# Patient Record
Sex: MALE | Race: WHITE | Employment: FULL TIME | URBAN - METROPOLITAN AREA
[De-identification: names, ages, dates, MRNs, and addresses within clinical notes are randomized per-mention and may not be internally consistent; named-entity substitution may affect disease eponyms.]

---

## 2019-07-10 ENCOUNTER — HOSPITAL ENCOUNTER (OUTPATIENT)
Age: 59
Discharge: HOME | End: 2019-07-12
Payer: COMMERCIAL

## 2019-07-10 ENCOUNTER — OFFICE VISIT (OUTPATIENT)
Dept: FAMILY MEDICINE CLINIC | Age: 59
End: 2019-07-10
Payer: COMMERCIAL

## 2019-07-10 VITALS
SYSTOLIC BLOOD PRESSURE: 110 MMHG | BODY MASS INDEX: 21.56 KG/M2 | OXYGEN SATURATION: 97 % | TEMPERATURE: 98 F | HEIGHT: 74 IN | WEIGHT: 168 LBS | DIASTOLIC BLOOD PRESSURE: 66 MMHG | HEART RATE: 60 BPM

## 2019-07-10 DIAGNOSIS — E55.9 VITAMIN D DEFICIENCY: ICD-10-CM

## 2019-07-10 DIAGNOSIS — E78.2 MIXED HYPERLIPIDEMIA: ICD-10-CM

## 2019-07-10 DIAGNOSIS — Z00.00 ROUTINE GENERAL MEDICAL EXAMINATION AT A HEALTH CARE FACILITY: ICD-10-CM

## 2019-07-10 DIAGNOSIS — R53.83 OTHER FATIGUE: ICD-10-CM

## 2019-07-10 DIAGNOSIS — Z12.5 SCREENING FOR MALIGNANT NEOPLASM OF PROSTATE: ICD-10-CM

## 2019-07-10 DIAGNOSIS — R55 NEUROCARDIOGENIC SYNCOPE: Primary | ICD-10-CM

## 2019-07-10 DIAGNOSIS — I34.1 MITRAL VALVE PROLAPSE: ICD-10-CM

## 2019-07-10 LAB
ALBUMIN SERPL-MCNC: 4.4 G/DL (ref 3.5–5.2)
ALP BLD-CCNC: 65 U/L (ref 40–129)
ALT SERPL-CCNC: 18 U/L (ref 0–40)
ANION GAP SERPL CALCULATED.3IONS-SCNC: 12 MMOL/L (ref 7–16)
AST SERPL-CCNC: 19 U/L (ref 0–39)
BASOPHILS ABSOLUTE: 0.03 E9/L (ref 0–0.2)
BASOPHILS RELATIVE PERCENT: 0.5 % (ref 0–2)
BILIRUB SERPL-MCNC: 0.6 MG/DL (ref 0–1.2)
BILIRUBIN URINE: NEGATIVE
BLOOD, URINE: NEGATIVE
BUN BLDV-MCNC: 14 MG/DL (ref 8–23)
CALCIUM SERPL-MCNC: 9.9 MG/DL (ref 8.6–10.2)
CHLORIDE BLD-SCNC: 103 MMOL/L (ref 98–107)
CHOLESTEROL, FASTING: 208 MG/DL (ref 0–199)
CLARITY: CLEAR
CO2: 26 MMOL/L (ref 22–29)
COLOR: YELLOW
CREAT SERPL-MCNC: 0.9 MG/DL (ref 0.7–1.2)
EOSINOPHILS ABSOLUTE: 0.09 E9/L (ref 0.05–0.5)
EOSINOPHILS RELATIVE PERCENT: 1.6 % (ref 0–6)
GFR AFRICAN AMERICAN: >60
GFR NON-AFRICAN AMERICAN: >60 ML/MIN/1.73
GLUCOSE BLD-MCNC: 90 MG/DL (ref 74–99)
GLUCOSE URINE: NEGATIVE MG/DL
HCT VFR BLD CALC: 45 % (ref 37–54)
HDLC SERPL-MCNC: 82 MG/DL
HEMOGLOBIN: 14.5 G/DL (ref 12.5–16.5)
IMMATURE GRANULOCYTES #: 0.01 E9/L
IMMATURE GRANULOCYTES %: 0.2 % (ref 0–5)
KETONES, URINE: NEGATIVE MG/DL
LDL CHOLESTEROL CALCULATED: 110 MG/DL (ref 0–99)
LEUKOCYTE ESTERASE, URINE: NEGATIVE
LYMPHOCYTES ABSOLUTE: 1.34 E9/L (ref 1.5–4)
LYMPHOCYTES RELATIVE PERCENT: 23.4 % (ref 20–42)
MAGNESIUM: 2.2 MG/DL (ref 1.6–2.6)
MCH RBC QN AUTO: 28.8 PG (ref 26–35)
MCHC RBC AUTO-ENTMCNC: 32.2 % (ref 32–34.5)
MCV RBC AUTO: 89.3 FL (ref 80–99.9)
MONOCYTES ABSOLUTE: 0.48 E9/L (ref 0.1–0.95)
MONOCYTES RELATIVE PERCENT: 8.4 % (ref 2–12)
NEUTROPHILS ABSOLUTE: 3.78 E9/L (ref 1.8–7.3)
NEUTROPHILS RELATIVE PERCENT: 65.9 % (ref 43–80)
NITRITE, URINE: NEGATIVE
PDW BLD-RTO: 13.9 FL (ref 11.5–15)
PH UA: 5 (ref 5–9)
PLATELET # BLD: 191 E9/L (ref 130–450)
PMV BLD AUTO: 11.2 FL (ref 7–12)
POTASSIUM SERPL-SCNC: 4.4 MMOL/L (ref 3.5–5)
PROSTATE SPECIFIC ANTIGEN: 1.05 NG/ML (ref 0–4)
PROTEIN UA: NEGATIVE MG/DL
RBC # BLD: 5.04 E12/L (ref 3.8–5.8)
SODIUM BLD-SCNC: 141 MMOL/L (ref 132–146)
SPECIFIC GRAVITY UA: >=1.03 (ref 1–1.03)
TOTAL PROTEIN: 6.9 G/DL (ref 6.4–8.3)
TRIGLYCERIDE, FASTING: 82 MG/DL (ref 0–149)
TSH SERPL DL<=0.05 MIU/L-ACNC: 2.13 UIU/ML (ref 0.27–4.2)
UROBILINOGEN, URINE: 0.2 E.U./DL
VITAMIN D 25-HYDROXY: 47 NG/ML (ref 30–100)
VLDLC SERPL CALC-MCNC: 16 MG/DL
WBC # BLD: 5.7 E9/L (ref 4.5–11.5)

## 2019-07-10 PROCEDURE — 85025 COMPLETE CBC W/AUTO DIFF WBC: CPT

## 2019-07-10 PROCEDURE — 80061 LIPID PANEL: CPT

## 2019-07-10 PROCEDURE — 81003 URINALYSIS AUTO W/O SCOPE: CPT

## 2019-07-10 PROCEDURE — 99396 PREV VISIT EST AGE 40-64: CPT | Performed by: INTERNAL MEDICINE

## 2019-07-10 PROCEDURE — 82306 VITAMIN D 25 HYDROXY: CPT

## 2019-07-10 PROCEDURE — 80053 COMPREHEN METABOLIC PANEL: CPT

## 2019-07-10 PROCEDURE — G0103 PSA SCREENING: HCPCS

## 2019-07-10 PROCEDURE — 84443 ASSAY THYROID STIM HORMONE: CPT

## 2019-07-10 PROCEDURE — 83735 ASSAY OF MAGNESIUM: CPT

## 2019-07-10 PROCEDURE — 36415 COLL VENOUS BLD VENIPUNCTURE: CPT

## 2019-07-10 RX ORDER — OMEGA-3 FATTY ACIDS CAP DELAYED RELEASE 1000 MG 1000 MG
CAPSULE DELAYED RELEASE ORAL
COMMUNITY
Start: 2013-06-19 | End: 2022-07-21 | Stop reason: ALTCHOICE

## 2019-07-10 RX ORDER — CHOLECALCIFEROL (VITAMIN D3) 125 MCG
CAPSULE ORAL
COMMUNITY
Start: 2013-06-23

## 2019-07-10 ASSESSMENT — ENCOUNTER SYMPTOMS
RHINORRHEA: 0
EYE PAIN: 0
BLOOD IN STOOL: 0
NAUSEA: 0
DIARRHEA: 0
ABDOMINAL PAIN: 0
SHORTNESS OF BREATH: 0
CONSTIPATION: 0
VOMITING: 0
COUGH: 0
SORE THROAT: 0
CHEST TIGHTNESS: 0

## 2019-07-10 ASSESSMENT — PATIENT HEALTH QUESTIONNAIRE - PHQ9
SUM OF ALL RESPONSES TO PHQ QUESTIONS 1-9: 0
2. FEELING DOWN, DEPRESSED OR HOPELESS: 0
SUM OF ALL RESPONSES TO PHQ QUESTIONS 1-9: 0
SUM OF ALL RESPONSES TO PHQ9 QUESTIONS 1 & 2: 0
1. LITTLE INTEREST OR PLEASURE IN DOING THINGS: 0

## 2019-07-10 NOTE — PROGRESS NOTES
Negative for abdominal pain, blood in stool, constipation, diarrhea, nausea and vomiting. Genitourinary: Negative for difficulty urinating, dysuria, hematuria, scrotal swelling, testicular pain and urgency. Musculoskeletal: Negative for arthralgias and gait problem. Skin: Negative for rash. Neurological: Negative for dizziness, syncope, weakness, light-headedness and headaches. Hematological: Negative for adenopathy. Does not bruise/bleed easily. Psychiatric/Behavioral: Negative for suicidal ideas. The patient is not nervous/anxious.           Current Outpatient Medications:     Omega-3 Fatty Acids (FISH OIL) 1000 MG CPDR, Take by mouth, Disp: , Rfl:     Multiple Vitamin (MULTIVITAMINS PO), Take by mouth, Disp: , Rfl:     Cholecalciferol (VITAMIN D3) 2000 units TABS, Take by mouth, Disp: , Rfl:     aspirin 81 MG tablet, Take 81 mg by mouth daily, Disp: , Rfl:     Allergies   Allergen Reactions    Penicillins Rash       Past Medical History:   Diagnosis Date    Mitral valve prolapse     Syncope June 2015       Past Surgical History:   Procedure Laterality Date    CYST REMOVAL Right     right leg       Family History   Problem Relation Age of Onset    Hyperthyroidism Mother     Atrial Fibrillation Father        Social History     Socioeconomic History    Marital status:      Spouse name: Not on file    Number of children: Not on file    Years of education: Not on file    Highest education level: Not on file   Occupational History    Not on file   Social Needs    Financial resource strain: Not on file    Food insecurity:     Worry: Not on file     Inability: Not on file    Transportation needs:     Medical: Not on file     Non-medical: Not on file   Tobacco Use    Smoking status: Never Smoker    Smokeless tobacco: Current User     Types: Chew   Substance and Sexual Activity    Alcohol use: Not on file     Comment: Occassionally on weekends    Drug use: Not on file    Sexual Plan:    Raz was seen today for annual exam and hyperlipidemia. Diagnoses and all orders for this visit:    Routine general medical examination at a health care facility  - discussed and reviewed immunizations   - discussed and reviewed screening - up to date on colonoscopy, prostate exam normal   - Discussed and reviewed other health maintenance topics   - update labs     Neurocardiogenic syncope  - neurocardiogenic syncope per tilt (2015)  - avoid dehydration  - aspirin    Mixed hyperlipidemia  - continue present treatment  - watch diet  - calculate 10 year risk   - not currently on medications     Mitral valve prolapse  - mitral valve prolapse  - follows with cardiology yearly (7/2019)     Vitamin D deficiency  - continue present treatment  - on otc vitamin D      Return in about 1 year (around 7/10/2020) for check up and review.       Orders Placed This Encounter   Procedures    Comprehensive Metabolic Panel     Standing Status:   Future     Standing Expiration Date:   7/10/2020    CBC with Differential     Standing Status:   Future     Standing Expiration Date:   7/10/2020    Magnesium     Standing Status:   Future     Standing Expiration Date:   7/10/2020    Lipid, Fasting     Standing Status:   Future     Standing Expiration Date:   7/10/2020    Vitamin D 25 Hydroxy     Standing Status:   Future     Standing Expiration Date:   7/10/2020    TSH without Reflex     Standing Status:   Future     Standing Expiration Date:   7/10/2020    Urinalysis     Standing Status:   Future     Standing Expiration Date:   7/10/2020    Psa screening     Standing Status:   Future     Standing Expiration Date:   7/10/2020       Maxwell Valentine MD  7/10/2019  10:32 AM

## 2019-08-09 PROBLEM — Z00.00 ROUTINE GENERAL MEDICAL EXAMINATION AT A HEALTH CARE FACILITY: Status: RESOLVED | Noted: 2019-07-10 | Resolved: 2019-08-09

## 2020-07-08 ENCOUNTER — TELEPHONE (OUTPATIENT)
Dept: FAMILY MEDICINE CLINIC | Age: 60
End: 2020-07-08

## 2020-07-08 ENCOUNTER — HOSPITAL ENCOUNTER (OUTPATIENT)
Age: 60
Discharge: HOME | End: 2020-07-10
Payer: COMMERCIAL

## 2020-07-08 ENCOUNTER — OFFICE VISIT (OUTPATIENT)
Dept: FAMILY MEDICINE CLINIC | Age: 60
End: 2020-07-08
Payer: COMMERCIAL

## 2020-07-08 VITALS
WEIGHT: 169 LBS | TEMPERATURE: 97 F | OXYGEN SATURATION: 98 % | HEART RATE: 77 BPM | HEIGHT: 75 IN | BODY MASS INDEX: 21.01 KG/M2 | DIASTOLIC BLOOD PRESSURE: 80 MMHG | SYSTOLIC BLOOD PRESSURE: 138 MMHG

## 2020-07-08 LAB
ALBUMIN SERPL-MCNC: 4.4 G/DL (ref 3.5–5.2)
ALP BLD-CCNC: 60 U/L (ref 40–129)
ALT SERPL-CCNC: 20 U/L (ref 0–40)
ANION GAP SERPL CALCULATED.3IONS-SCNC: 13 MMOL/L (ref 7–16)
AST SERPL-CCNC: 20 U/L (ref 0–39)
BASOPHILS ABSOLUTE: 0.03 E9/L (ref 0–0.2)
BASOPHILS RELATIVE PERCENT: 0.6 % (ref 0–2)
BILIRUB SERPL-MCNC: 0.7 MG/DL (ref 0–1.2)
BILIRUBIN URINE: NEGATIVE
BLOOD, URINE: NEGATIVE
BUN BLDV-MCNC: 12 MG/DL (ref 8–23)
CALCIUM SERPL-MCNC: 9.5 MG/DL (ref 8.6–10.2)
CHLORIDE BLD-SCNC: 105 MMOL/L (ref 98–107)
CHOLESTEROL, FASTING: 211 MG/DL (ref 0–199)
CLARITY: CLEAR
CO2: 22 MMOL/L (ref 22–29)
COLOR: YELLOW
CREAT SERPL-MCNC: 0.9 MG/DL (ref 0.7–1.2)
EOSINOPHILS ABSOLUTE: 0.09 E9/L (ref 0.05–0.5)
EOSINOPHILS RELATIVE PERCENT: 1.9 % (ref 0–6)
GFR AFRICAN AMERICAN: >60
GFR NON-AFRICAN AMERICAN: >60 ML/MIN/1.73
GLUCOSE BLD-MCNC: 102 MG/DL (ref 74–99)
GLUCOSE URINE: NEGATIVE MG/DL
HCT VFR BLD CALC: 42.6 % (ref 37–54)
HDLC SERPL-MCNC: 81 MG/DL
HEMOGLOBIN: 14 G/DL (ref 12.5–16.5)
IMMATURE GRANULOCYTES #: 0.01 E9/L
IMMATURE GRANULOCYTES %: 0.2 % (ref 0–5)
KETONES, URINE: NEGATIVE MG/DL
LDL CHOLESTEROL CALCULATED: 113 MG/DL (ref 0–99)
LEUKOCYTE ESTERASE, URINE: NEGATIVE
LYMPHOCYTES ABSOLUTE: 1.1 E9/L (ref 1.5–4)
LYMPHOCYTES RELATIVE PERCENT: 23.3 % (ref 20–42)
MAGNESIUM: 2.2 MG/DL (ref 1.6–2.6)
MCH RBC QN AUTO: 28.7 PG (ref 26–35)
MCHC RBC AUTO-ENTMCNC: 32.9 % (ref 32–34.5)
MCV RBC AUTO: 87.5 FL (ref 80–99.9)
MONOCYTES ABSOLUTE: 0.42 E9/L (ref 0.1–0.95)
MONOCYTES RELATIVE PERCENT: 8.9 % (ref 2–12)
NEUTROPHILS ABSOLUTE: 3.08 E9/L (ref 1.8–7.3)
NEUTROPHILS RELATIVE PERCENT: 65.1 % (ref 43–80)
NITRITE, URINE: NEGATIVE
PDW BLD-RTO: 13.6 FL (ref 11.5–15)
PH UA: 6 (ref 5–9)
PLATELET # BLD: 186 E9/L (ref 130–450)
PMV BLD AUTO: 11.2 FL (ref 7–12)
POTASSIUM SERPL-SCNC: 4.2 MMOL/L (ref 3.5–5)
PROSTATE SPECIFIC ANTIGEN: 1.08 NG/ML (ref 0–4)
PROTEIN UA: NEGATIVE MG/DL
RBC # BLD: 4.87 E12/L (ref 3.8–5.8)
SODIUM BLD-SCNC: 140 MMOL/L (ref 132–146)
SPECIFIC GRAVITY UA: 1.01 (ref 1–1.03)
TOTAL PROTEIN: 6.3 G/DL (ref 6.4–8.3)
TRIGLYCERIDE, FASTING: 83 MG/DL (ref 0–149)
TSH SERPL DL<=0.05 MIU/L-ACNC: 2.46 UIU/ML (ref 0.27–4.2)
UROBILINOGEN, URINE: 0.2 E.U./DL
VLDLC SERPL CALC-MCNC: 17 MG/DL
WBC # BLD: 4.7 E9/L (ref 4.5–11.5)

## 2020-07-08 PROCEDURE — 69210 REMOVE IMPACTED EAR WAX UNI: CPT | Performed by: INTERNAL MEDICINE

## 2020-07-08 PROCEDURE — 80061 LIPID PANEL: CPT

## 2020-07-08 PROCEDURE — 84443 ASSAY THYROID STIM HORMONE: CPT

## 2020-07-08 PROCEDURE — 85025 COMPLETE CBC W/AUTO DIFF WBC: CPT

## 2020-07-08 PROCEDURE — 80053 COMPREHEN METABOLIC PANEL: CPT

## 2020-07-08 PROCEDURE — 36415 COLL VENOUS BLD VENIPUNCTURE: CPT

## 2020-07-08 PROCEDURE — 83735 ASSAY OF MAGNESIUM: CPT

## 2020-07-08 PROCEDURE — 81003 URINALYSIS AUTO W/O SCOPE: CPT

## 2020-07-08 PROCEDURE — G0103 PSA SCREENING: HCPCS

## 2020-07-08 PROCEDURE — 99396 PREV VISIT EST AGE 40-64: CPT | Performed by: INTERNAL MEDICINE

## 2020-07-08 ASSESSMENT — ENCOUNTER SYMPTOMS
NAUSEA: 0
DIARRHEA: 0
COUGH: 0
SHORTNESS OF BREATH: 0
CHEST TIGHTNESS: 0
EYE PAIN: 0
BLOOD IN STOOL: 0
SORE THROAT: 0
ABDOMINAL PAIN: 0
VOMITING: 0
CONSTIPATION: 0
RHINORRHEA: 0

## 2020-07-08 NOTE — TELEPHONE ENCOUNTER
Please let the patient know that blood work results showed    Cholesterol levels were elevated. HDL or good cholesterol was also elevated in the beneficial range. The 10-year ASCVD risk score (Pablo Oh., et al., 2013) is: 12.3%, which is borderline higher risk for heart vascular complications and would consider statin medications    Sugar was elevated. Would recommend A1c for 3-month sugar control assessment. Recommend to watch diet from carbohydrates and sugars.      Thyroid labs were normal    PSA for prostate was normal    Urine analysis was normal     Electrolytes, liver, and kidney functions were normal    Blood counts were normal    Please send a copy of the blood work results to the patient    Thanks

## 2020-07-08 NOTE — PROGRESS NOTES
[facility]) Physicians   Internal Medicine     2020  Raz Molina. : 1960 Sex: male  Age:59 y.o. Chief Complaint   Patient presents with    Annual Exam        HPI:   Patient presents to office for review and management of chronic medical conditions.     - No previous office visit, urgent care, ER or hospital visits since last visit. No surgeries or other procedures. - States cerumen impaction. States had ENT in past.  States ear discomfort and hearing changes improved. - States no further passing out since last visit. has seen cardiology and EP. States had tilt table (). States was positive for neurocardiogenic syncope. No further passing out or blacking out. States no changes. States trying to stay hydrated. - Has mitral valve prolapse - follows with cardiology yearly - last visit 3/2018. Had echo 3544 - Showed diastolic dysfunction and left vent hypertrophy - no valve disease. Follow up yearly. - States has some chronic aches to joints. States seem to move and hurts somewhere different locations on different days. Not taking anything for pain. - States has high cholesterol. Trying to watch diet. Not currently on medications.     - States having decreased urine flow, sates increased frequency. No nocturia. Health Maintenance   - immunizations:   Influenza Vaccination  - (2013), (2015), (2016), (2017), (2018), (2019)   Pneumonia Vaccination  Zoster/Shingles Vaccine  Tetanus Vaccination (2018) - [facility]     - Screenings:   Colonoscopy - (2016) - tortuous - repeat 8-10yrs  Prostate     tilt table - () - positive for neurocardiogenic syncope    2D ECHO - (3/2017) - stage II diastolic dysfunction, mild LVH    ROS:  Review of Systems   Constitutional: Negative for appetite change, chills, fever and unexpected weight change. HENT: Negative for congestion, rhinorrhea and sore throat. Eyes: Negative for pain and visual disturbance.    Respiratory: Negative for cough, chest tightness and shortness of breath. Cardiovascular: Negative for chest pain and palpitations. Gastrointestinal: Negative for abdominal pain, blood in stool, constipation, diarrhea, nausea and vomiting. Genitourinary: Positive for frequency. Negative for difficulty urinating, dysuria, hematuria, scrotal swelling, testicular pain and urgency. Musculoskeletal: Negative for arthralgias and gait problem. Skin: Negative for rash. Neurological: Negative for dizziness, syncope, weakness, light-headedness and headaches. Hematological: Negative for adenopathy. Does not bruise/bleed easily. Psychiatric/Behavioral: Negative for suicidal ideas. The patient is not nervous/anxious.           Current Outpatient Medications:     Omega-3 Fatty Acids (FISH OIL) 1000 MG CPDR, Take by mouth, Disp: , Rfl:     Multiple Vitamin (MULTIVITAMINS PO), Take by mouth, Disp: , Rfl:     Cholecalciferol (VITAMIN D3) 2000 units TABS, Take by mouth, Disp: , Rfl:     aspirin 81 MG tablet, Take 81 mg by mouth daily, Disp: , Rfl:     Allergies   Allergen Reactions    Penicillins Rash       Past Medical History:   Diagnosis Date    Hyperlipidemia     Mitral valve prolapse     Mononucleosis     Syncope June 2015       Past Surgical History:   Procedure Laterality Date    CYST REMOVAL Right     right leg       Family History   Problem Relation Age of Onset    Hyperthyroidism Mother     High Blood Pressure Mother     Thyroid Disease Mother     Atrial Fibrillation Father     High Cholesterol Father     Heart Disease Father     Diabetes Father         Non Insulin Dependent        Social History     Socioeconomic History    Marital status:      Spouse name: Not on file    Number of children: Not on file    Years of education: Not on file    Highest education level: Not on file   Occupational History    Not on file   Social Needs    Financial resource strain: Not on file    Food insecurity     Worry: Not on file     Inability: Not on file    Transportation needs     Medical: Not on file     Non-medical: Not on file   Tobacco Use    Smoking status: Light Tobacco Smoker     Types: Cigars    Smokeless tobacco: Current User     Types: Chew   Substance and Sexual Activity    Alcohol use: Yes     Comment: Occassionally on weekends    Drug use: Not on file    Sexual activity: Not on file   Lifestyle    Physical activity     Days per week: Not on file     Minutes per session: Not on file    Stress: Not on file   Relationships    Social connections     Talks on phone: Not on file     Gets together: Not on file     Attends Judaism service: Not on file     Active member of club or organization: Not on file     Attends meetings of clubs or organizations: Not on file     Relationship status: Not on file    Intimate partner violence     Fear of current or ex partner: Not on file     Emotionally abused: Not on file     Physically abused: Not on file     Forced sexual activity: Not on file   Other Topics Concern    Not on file   Social History Narrative    Not on file       Vitals:    07/08/20 1001   BP: 138/80  Comment: 138 80   Site: Left Upper Arm   Position: Sitting   Cuff Size: Large Adult   Pulse: 77   Temp: 97 °F (36.1 °C)   SpO2: 98%   Weight: 169 lb (76.7 kg)   Height: 6' 3\" (1.905 m)       Exam:  Physical Exam  Constitutional:       Appearance: He is well-developed. HENT:      Head: Normocephalic and atraumatic. Right Ear: External ear normal. There is impacted cerumen. Left Ear: External ear normal.   Eyes:      Pupils: Pupils are equal, round, and reactive to light. Neck:      Musculoskeletal: Normal range of motion and neck supple. Thyroid: No thyromegaly. Cardiovascular:      Rate and Rhythm: Normal rate and regular rhythm. Heart sounds: Normal heart sounds. No murmur. Pulmonary:      Effort: Pulmonary effort is normal.      Breath sounds: Normal breath sounds.  No wheezing or Standing Status:   Future     Number of Occurrences:   1     Standing Expiration Date:   7/8/2021    Urinalysis     Standing Status:   Future     Number of Occurrences:   1     Standing Expiration Date:   7/8/2021    Psa screening     Standing Status:   Future     Number of Occurrences:   1     Standing Expiration Date:   7/8/2021    CBC Auto Differential     Standing Status:   Future     Number of Occurrences:   1     Standing Expiration Date:   7/8/2021    NC REMOVAL IMPACTED CERUMEN INSTRUMENTATION UNILAT     Requested Prescriptions      No prescriptions requested or ordered in this encounter         Yuridia Dominguez MD  7/8/2020  12:05 PM

## 2020-07-08 NOTE — LETTER
[facility]  [address] [place] [address]  Phone: [phone]  Fax: [phone]    Domenic Coleman MD        July 9, 2020     [address] [facility] [address]      Dear Je Nam:    Below are the results from your recent visit:    Resulted Orders   CBC Auto Differential   Result Value Ref Range    WBC 4.7 4.5 - 11.5 E9/L    RBC 4.87 3.80 - 5.80 E12/L    Hemoglobin 14.0 12.5 - 16.5 g/dL    Hematocrit 42.6 37.0 - 54.0 %    MCV 87.5 80.0 - 99.9 fL    MCH 28.7 26.0 - 35.0 pg    MCHC 32.9 32.0 - 34.5 %    RDW 13.6 11.5 - 15.0 fL    Platelets 432 879 - 445 E9/L    MPV 11.2 7.0 - 12.0 fL    Neutrophils % 65.1 43.0 - 80.0 %    Immature Granulocytes % 0.2 0.0 - 5.0 %    Lymphocytes % 23.3 20.0 - 42.0 %    Monocytes % 8.9 2.0 - 12.0 %    Eosinophils % 1.9 0.0 - 6.0 %    Basophils % 0.6 0.0 - 2.0 %    Neutrophils Absolute 3.08 1.80 - 7.30 E9/L    Immature Granulocytes # 0.01 E9/L    Lymphocytes Absolute 1.10 (L) 1.50 - 4.00 E9/L    Monocytes Absolute 0.42 0.10 - 0.95 E9/L    Eosinophils Absolute 0.09 0.05 - 0.50 E9/L    Basophils Absolute 0.03 0.00 - 0.20 E9/L   Psa screening   Result Value Ref Range    PSA 1.08 0.00 - 4.00 ng/mL   Urinalysis   Result Value Ref Range    Color, UA Yellow Straw/Yellow    Clarity, UA Clear Clear    Glucose, Ur Negative Negative mg/dL    Bilirubin Urine Negative Negative    Ketones, Urine Negative Negative mg/dL    Specific Gravity, UA 1.015 1.005 - 1.030    Blood, Urine Negative Negative    pH, UA 6.0 5.0 - 9.0    Protein, UA Negative Negative mg/dL    Urobilinogen, Urine 0.2 <2.0 E.U./dL    Nitrite, Urine Negative Negative    Leukocyte Esterase, Urine Negative Negative   TSH without Reflex   Result Value Ref Range    TSH 2.460 0.270 - 4.200 uIU/mL   Lipid, Fasting   Result Value Ref Range    Cholesterol, Fasting 211 (H) 0 - 199 mg/dL    Triglyceride, Fasting 83 0 - 149 mg/dL    HDL 81 >40 mg/dL LDL Calculated 113 (H) 0 - 99 mg/dL    VLDL Cholesterol Calculated 17 mg/dL   Magnesium   Result Value Ref Range    Magnesium 2.2 1.6 - 2.6 mg/dL   Comprehensive Metabolic Panel   Result Value Ref Range    Sodium 140 132 - 146 mmol/L    Potassium 4.2 3.5 - 5.0 mmol/L    Chloride 105 98 - 107 mmol/L    CO2 22 22 - 29 mmol/L    Anion Gap 13 7 - 16 mmol/L    Glucose 102 (H) 74 - 99 mg/dL    BUN 12 8 - 23 mg/dL    CREATININE 0.9 0.7 - 1.2 mg/dL    GFR Non-African American >60 >=60 mL/min/1.73      Comment:      Chronic Kidney Disease: less than 60 ml/min/1.73 sq.m. Kidney Failure: less than 15 ml/min/1.73 sq.m. Results valid for patients 18 years and older. GFR  >60     Calcium 9.5 8.6 - 10.2 mg/dL    Total Protein 6.3 (L) 6.4 - 8.3 g/dL    Alb 4.4 3.5 - 5.2 g/dL    Total Bilirubin 0.7 0.0 - 1.2 mg/dL    Alkaline Phosphatase 60 40 - 129 U/L    ALT 20 0 - 40 U/L    AST 20 0 - 39 U/L   Please let the patient know that blood work results showed  Cholesterol levels were elevated. HDL or good cholesterol was also elevated in the beneficial range. The 10-year ASCVD risk score (Yasir Kowalski., et al., 2013) is: 12.3%, which is borderline higher risk for heart vascular complications and would consider statin medications  Sugar was elevated. Would recommend A1c for 3-month sugar control assessment. Recommend to watch diet from carbohydrates and sugars. Thyroid labs were normal  PSA for prostate was normal  Urine analysis was normal   Electrolytes, liver, and kidney functions were normal  Blood counts were normal  Please send a copy of the blood work results to the patient    If you have any questions or concerns, please don't hesitate to call.     Sincerely,    Sherwin Ruiz MD

## 2021-05-18 ENCOUNTER — OFFICE VISIT (OUTPATIENT)
Dept: FAMILY MEDICINE CLINIC | Age: 61
End: 2021-05-18
Payer: COMMERCIAL

## 2021-05-18 VITALS
SYSTOLIC BLOOD PRESSURE: 128 MMHG | WEIGHT: 167 LBS | HEART RATE: 75 BPM | DIASTOLIC BLOOD PRESSURE: 70 MMHG | BODY MASS INDEX: 20.76 KG/M2 | OXYGEN SATURATION: 98 % | HEIGHT: 75 IN | TEMPERATURE: 97.5 F

## 2021-05-18 DIAGNOSIS — M54.50 ACUTE BILATERAL LOW BACK PAIN WITHOUT SCIATICA: Primary | ICD-10-CM

## 2021-05-18 PROCEDURE — 99213 OFFICE O/P EST LOW 20 MIN: CPT | Performed by: INTERNAL MEDICINE

## 2021-05-18 ASSESSMENT — ENCOUNTER SYMPTOMS
COUGH: 0
CHEST TIGHTNESS: 0
DIARRHEA: 0
EYE PAIN: 0
BLOOD IN STOOL: 0
SORE THROAT: 0
CONSTIPATION: 0
RHINORRHEA: 0
ABDOMINAL PAIN: 0
NAUSEA: 0
SHORTNESS OF BREATH: 0
VOMITING: 0

## 2021-05-18 ASSESSMENT — PATIENT HEALTH QUESTIONNAIRE - PHQ9
SUM OF ALL RESPONSES TO PHQ QUESTIONS 1-9: 0
SUM OF ALL RESPONSES TO PHQ QUESTIONS 1-9: 0

## 2021-05-18 NOTE — PATIENT INSTRUCTIONS
Patient Education        Low Back Pain: Exercises  Introduction  Here are some examples of exercises for you to try. The exercises may be suggested for a condition or for rehabilitation. Start each exercise slowly. Ease off the exercises if you start to have pain. You will be told when to start these exercises and which ones will work best for you. How to do the exercises  Press-up   1. Lie on your stomach, supporting your body with your forearms. 2. Press your elbows down into the floor to raise your upper back. As you do this, relax your stomach muscles and allow your back to arch without using your back muscles. As your press up, do not let your hips or pelvis come off the floor. 3. Hold for 15 to 30 seconds, then relax. 4. Repeat 2 to 4 times. Alternate arm and leg (bird dog) exercise   Do this exercise slowly. Try to keep your body straight at all times, and do not let one hip drop lower than the other. 1. Start on the floor, on your hands and knees. 2. Tighten your belly muscles. 3. Raise one leg off the floor, and hold it straight out behind you. Be careful not to let your hip drop down, because that will twist your trunk. 4. Hold for about 6 seconds, then lower your leg and switch to the other leg. 5. Repeat 8 to 12 times on each leg. 6. Over time, work up to holding for 10 to 30 seconds each time. 7. If you feel stable and secure with your leg raised, try raising the opposite arm straight out in front of you at the same time. Knee-to-chest exercise   1. Lie on your back with your knees bent and your feet flat on the floor. 2. Bring one knee to your chest, keeping the other foot flat on the floor (or keeping the other leg straight, whichever feels better on your lower back). 3. Keep your lower back pressed to the floor. Hold for at least 15 to 30 seconds. 4. Relax, and lower the knee to the starting position. 5. Repeat with the other leg. Repeat 2 to 4 times with each leg.   6. To get more stretch, put your other leg flat on the floor while pulling your knee to your chest.    Curl-ups   1. Lie on the floor on your back with your knees bent at a 90-degree angle. Your feet should be flat on the floor, about 12 inches from your buttocks. 2. Cross your arms over your chest. If this bothers your neck, try putting your hands behind your neck (not your head), with your elbows spread apart. 3. Slowly tighten your belly muscles and raise your shoulder blades off the floor. 4. Keep your head in line with your body, and do not press your chin to your chest.  5. Hold this position for 1 or 2 seconds, then slowly lower yourself back down to the floor. 6. Repeat 8 to 12 times. Pelvic tilt exercise   1. Lie on your back with your knees bent. 2. \"Brace\" your stomach. This means to tighten your muscles by pulling in and imagining your belly button moving toward your spine. You should feel like your back is pressing to the floor and your hips and pelvis are rocking back. 3. Hold for about 6 seconds while you breathe smoothly. 4. Repeat 8 to 12 times. Heel dig bridging   1. Lie on your back with both knees bent and your ankles bent so that only your heels are digging into the floor. Your knees should be bent about 90 degrees. 2. Then push your heels into the floor, squeeze your buttocks, and lift your hips off the floor until your shoulders, hips, and knees are all in a straight line. 3. Hold for about 6 seconds as you continue to breathe normally, and then slowly lower your hips back down to the floor and rest for up to 10 seconds. 4. Do 8 to 12 repetitions. Hamstring stretch in doorway   1. Lie on your back in a doorway, with one leg through the open door. 2. Slide your leg up the wall to straighten your knee. You should feel a gentle stretch down the back of your leg. 3. Hold the stretch for at least 15 to 30 seconds. Do not arch your back, point your toes, or bend either knee.  Keep one heel touching the floor and the other heel touching the wall. 4. Repeat with your other leg. 5. Do 2 to 4 times for each leg. Hip flexor stretch   1. Kneel on the floor with one knee bent and one leg behind you. Place your forward knee over your foot. Keep your other knee touching the floor. 2. Slowly push your hips forward until you feel a stretch in the upper thigh of your rear leg. 3. Hold the stretch for at least 15 to 30 seconds. Repeat with your other leg. 4. Do 2 to 4 times on each side. Wall sit   1. Stand with your back 10 to 12 inches away from a wall. 2. Lean into the wall until your back is flat against it. 3. Slowly slide down until your knees are slightly bent, pressing your lower back into the wall. 4. Hold for about 6 seconds, then slide back up the wall. 5. Repeat 8 to 12 times. Follow-up care is a key part of your treatment and safety. Be sure to make and go to all appointments, and call your doctor if you are having problems. It's also a good idea to know your test results and keep a list of the medicines you take. Where can you learn more? Go to https://[employer].Clinical Data. org and sign in to your [employer] account. Enter N546 in the [employer] box to learn more about \"Low Back Pain: Exercises. \"     If you do not have an account, please click on the \"Sign Up Now\" link. Current as of: November 16, 2020               Content Version: 12.8  © 2006-2021 [employer]. Care instructions adapted under license by [facility] ([facility]). If you have questions about a medical condition or this instruction, always ask your healthcare professional. [place] [address] any warranty or liability for your use of this information.

## 2021-05-18 NOTE — PROGRESS NOTES
[facility]) Physicians   Internal Medicine     2021  Raz Brian. : 1960 Sex: male  Age:59 y.o. Chief Complaint   Patient presents with    Back Pain     Lower right sided back pain for the last 6 weeks. Denies injury.  Blood Pressure Check     Checking BP at home w/ a wrist cuff. BP today on home machine was 139/82         HPI:   Patient presents to office for evaluation of the following medical concerns. - States having low back pain. States started about 6 weeks ago. States worse if bending over. States right sided lateral paraspinal muscles. States will be an ache and other times is sharp. Currently pain is 0/10. States still if sitting for prolonged times and is relieved if walks around. No numbness, tingling or weakness. No trauma or injury. No fever or chills. No nausea or vomiting. No abdo pain, diarrhea, constipation, Melena or hematochezia. No dysuria, frequency, urgency, hematuria. - States cerumen impaction. States had ENT in past.  States ear discomfort and hearing changes improved. - States no further passing out since last visit. has seen cardiology and EP. States had tilt table (). States was positive for neurocardiogenic syncope. No further passing out or blacking out. States no changes. States trying to stay hydrated. - Has mitral valve prolapse - follows with cardiology yearly - last visit 3/2018. Had echo 1411 - Showed diastolic dysfunction and left vent hypertrophy - no valve disease. Follow up yearly. - States has some chronic aches to joints. States seem to move and hurts somewhere different locations on different days. Not taking anything for pain. - States has high cholesterol. Trying to watch diet. Not currently on medications.     - States having decreased urine flow, sates increased frequency. No nocturia.      Health Maintenance   - immunizations:   Influenza Vaccination  - (2013), (2015), (2016), (2017), (2018), (2019), (2020) Pneumonia Vaccination  Zoster/Shingles Vaccine - (10/2020) #1, (12/5/2020) #2 - shingrix  Tetanus Vaccination (2018) - [facility]   covid (3/12/2021) #1, (4/9/2021) #2 - moderna     - Screenings:   Colonoscopy - (4/2016) - tortuous - repeat 8-10yrs  Prostate     tilt table - (2015) - positive for neurocardiogenic syncope    2D ECHO - (3/2017) - stage II diastolic dysfunction, mild LVH    ROS:  Review of Systems   Constitutional: Negative for appetite change, chills, fever and unexpected weight change. HENT: Negative for congestion, rhinorrhea and sore throat. Eyes: Negative for pain and visual disturbance. Respiratory: Negative for cough, chest tightness and shortness of breath. Cardiovascular: Negative for chest pain and palpitations. Gastrointestinal: Negative for abdominal pain, blood in stool, constipation, diarrhea, nausea and vomiting. Genitourinary: Positive for frequency. Negative for difficulty urinating, dysuria, hematuria, scrotal swelling, testicular pain and urgency. Musculoskeletal: Negative for arthralgias and gait problem. Skin: Negative for rash. Neurological: Negative for dizziness, syncope, weakness, light-headedness and headaches. Hematological: Negative for adenopathy. Does not bruise/bleed easily. Psychiatric/Behavioral: Negative for suicidal ideas. The patient is not nervous/anxious.           Current Outpatient Medications:     Omega-3 Fatty Acids (FISH OIL) 1000 MG CPDR, Take by mouth, Disp: , Rfl:     Multiple Vitamin (MULTIVITAMINS PO), Take by mouth, Disp: , Rfl:     Cholecalciferol (VITAMIN D3) 2000 units TABS, Take by mouth, Disp: , Rfl:     aspirin 81 MG tablet, Take 81 mg by mouth daily, Disp: , Rfl:     Allergies   Allergen Reactions    Penicillins Rash       Past Medical History:   Diagnosis Date    Hyperlipidemia     Mitral valve prolapse     Mononucleosis     Syncope June 2015       Past Surgical History:   Procedure Laterality Date    CYST REMOVAL Right     right leg       Family History   Problem Relation Age of Onset    Hyperthyroidism Mother     High Blood Pressure Mother     Thyroid Disease Mother     Atrial Fibrillation Father     High Cholesterol Father     Heart Disease Father     Diabetes Father         Non Insulin Dependent        Social History     Socioeconomic History    Marital status:      Spouse name: Not on file    Number of children: Not on file    Years of education: Not on file    Highest education level: Not on file   Occupational History    Not on file   Tobacco Use    Smoking status: Former Smoker     Types: Cigars    Smokeless tobacco: Current User     Types: Chew    Tobacco comment: Former cigar smoker. Substance and Sexual Activity    Alcohol use: Yes     Comment: Occassionally on weekends    Drug use: Not on file    Sexual activity: Not on file   Other Topics Concern    Not on file   Social History Narrative    Not on file     Social Determinants of Health     Financial Resource Strain:     Difficulty of Paying Living Expenses:    Food Insecurity:     Worried About Running Out of Food in the Last Year:     [address] in the Last Year:    Transportation Needs:     Lack of Transportation (Medical):      Lack of Transportation (Non-Medical):    Physical Activity:     Days of Exercise per Week:     Minutes of Exercise per Session:    Stress:     Feeling of Stress :    Social Connections:     Frequency of Communication with Friends and Family:     Frequency of Social Gatherings with Friends and Family:     Attends [employer]ist Services:     Active Member of Clubs or Organizations:     Attends Club or Organization Meetings:     Marital Status:    Intimate Partner Violence:     Fear of Current or Ex-Partner:     Emotionally Abused:     Physically Abused:     Sexually Abused:        Vitals:    05/18/21 1043 05/18/21 1103   BP: 130/80 128/70   Site: Right Upper Arm Left Upper Arm   Position: cardiology yearly (7/2019)     Vitamin D deficiency  - continue present treatment  - on otc vitamin D    Impacted cerumen of right ear  - cerumen removed via curette - no trauma noted, canal open post removal     Return for check up and review, as scheduled.     Orders Placed This Encounter   Procedures    XR LUMBAR SPINE (2-3 VIEWS)     Standing Status:   Future     Standing Expiration Date:   5/18/2022     Order Specific Question:   Reason for exam:     Answer:   right paraspinal low back pain   [facility] [address], [address], [address], General acute hospital     Referral Priority:   Routine     Referral Type:   Eval and Treat     Referral Reason:   Specialty Services Required     Referred to Provider:   Milton Babinski, DC     Requested Specialty:   Chiropractic Medicine     Number of Visits Requested:   1     Requested Prescriptions      No prescriptions requested or ordered in this encounter         Kristy Riley MD  5/18/2021  11:29 AM

## 2021-05-19 ENCOUNTER — TELEPHONE (OUTPATIENT)
Dept: FAMILY MEDICINE CLINIC | Age: 61
End: 2021-05-19

## 2021-05-19 NOTE — TELEPHONE ENCOUNTER
Please let the patient know that x-ray of the lumbar spine per radiology report suggested    Lumbar spine showed some mild spurring type changes and some more pronounced vertebral facet arthritic type changes at L5 and S1    No fractures dislocations or other bony abnormalities    Recommend to continue current treatment recommendations and to call if worsening or not improving    Thanks

## 2021-05-26 ENCOUNTER — OFFICE VISIT (OUTPATIENT)
Dept: CHIROPRACTIC MEDICINE | Age: 61
End: 2021-05-26
Payer: COMMERCIAL

## 2021-05-26 VITALS — RESPIRATION RATE: 16 BRPM | TEMPERATURE: 97.7 F | HEART RATE: 61 BPM | OXYGEN SATURATION: 97 %

## 2021-05-26 DIAGNOSIS — G89.29 CHRONIC RIGHT-SIDED LOW BACK PAIN WITHOUT SCIATICA: Primary | ICD-10-CM

## 2021-05-26 DIAGNOSIS — M54.50 CHRONIC RIGHT-SIDED LOW BACK PAIN WITHOUT SCIATICA: Primary | ICD-10-CM

## 2021-05-26 DIAGNOSIS — M47.816 LUMBAR SPONDYLOSIS: ICD-10-CM

## 2021-05-26 PROCEDURE — 99203 OFFICE O/P NEW LOW 30 MIN: CPT | Performed by: CHIROPRACTOR

## 2021-05-26 PROCEDURE — 98940 CHIROPRACT MANJ 1-2 REGIONS: CPT | Performed by: CHIROPRACTOR

## 2021-05-26 PROCEDURE — 97014 ELECTRIC STIMULATION THERAPY: CPT | Performed by: CHIROPRACTOR

## 2021-05-26 ASSESSMENT — ENCOUNTER SYMPTOMS
COUGH: 0
BACK PAIN: 1
SHORTNESS OF BREATH: 0
BOWEL INCONTINENCE: 0

## 2021-05-26 NOTE — PROGRESS NOTES
[facility] N LIMA CHIRO    21  Max E [facility]. : 1960 Sex: male  Age: 56 y.o. Patient was referred by Cindy Melendez MD    Chief Complaint   Patient presents with    Lower Back Pain     Right sided lower back pain for 6 weeks. No known injury        Chronic LBP - worse over last 6 weeks  Used to come on after driving or riding tractor. 6weeks ago came on, hit right side, and has been off and on since  Worse with forward bending, sitting for ~15+ mins then trying to get up and go it will get him  Lifting-ok     Back Pain  This is a chronic problem. The current episode started more than 1 year ago. The problem occurs intermittently. The pain is present in the lumbar spine. Quality: poke, catch  The pain does not radiate. Pain scale: 1 now, up to 5 at worst. The symptoms are aggravated by coughing, bending and sitting. Pertinent negatives include no bladder incontinence, bowel incontinence, fever, leg pain, numbness, tingling or weakness. He has tried home exercises for the symptoms. Red Flags:  none    Review of Systems   Constitutional: Negative for chills and fever. Respiratory: Negative for cough and shortness of breath. Gastrointestinal: Negative for bowel incontinence. Genitourinary: Negative for bladder incontinence. Musculoskeletal: Positive for back pain. Neurological: Negative for tingling, weakness and numbness.          Current Outpatient Medications:     Omega-3 Fatty Acids (FISH OIL) 1000 MG CPDR, Take by mouth, Disp: , Rfl:     Multiple Vitamin (MULTIVITAMINS PO), Take by mouth, Disp: , Rfl:     Cholecalciferol (VITAMIN D3) 2000 units TABS, Take by mouth, Disp: , Rfl:     aspirin 81 MG tablet, Take 81 mg by mouth daily, Disp: , Rfl:     Allergies   Allergen Reactions    Penicillins Rash       Past Medical History:   Diagnosis Date    Hyperlipidemia     Mitral valve prolapse     Mononucleosis     Syncope 2015     Family History   Problem Relation Age of Onset    Hyperthyroidism Mother     High Blood Pressure Mother     Thyroid Disease Mother     Atrial Fibrillation Father     High Cholesterol Father     Heart Disease Father     Diabetes Father         Non Insulin Dependent      Past Surgical History:   Procedure Laterality Date    CYST REMOVAL Right     right leg     Social History     Socioeconomic History    Marital status:      Spouse name: Not on file    Number of children: Not on file    Years of education: Not on file    Highest education level: Not on file   Occupational History    Not on file   Tobacco Use    Smoking status: Former Smoker     Types: Cigars    Smokeless tobacco: Current User     Types: Chew    Tobacco comment: Former cigar smoker. Substance and Sexual Activity    Alcohol use: Yes     Comment: Occassionally on weekends    Drug use: Not on file    Sexual activity: Not on file   Other Topics Concern    Not on file   Social History Narrative    Not on file     Social Determinants of Health     Financial Resource Strain:     Difficulty of Paying Living Expenses:    Food Insecurity:     Worried About Running Out of Food in the Last Year:     [address] in the Last Year:    Transportation Needs:     Lack of Transportation (Medical):      Lack of Transportation (Non-Medical):    Physical Activity:     Days of Exercise per Week:     Minutes of Exercise per Session:    Stress:     Feeling of Stress :    Social Connections:     Frequency of Communication with Friends and Family:     Frequency of Social Gatherings with Friends and Family:     Attends Scientologist Services:     Active Member of Clubs or Organizations:     Attends Club or Organization Meetings:     Marital Status:    Intimate Partner Violence:     Fear of Current or Ex-Partner:     Emotionally Abused:     Physically Abused:     Sexually Abused:        Vitals:    05/26/21 0821   Pulse: 61   Resp: 16   Temp: 97.7 °F (36.5 °C)   TempSrc: Temporal SpO2: 97%       EXAM:  Appears well. No acute distress. Oriented x3. Ambulates without difficulty. No antalgia or list.    Lumbar flexion: 60/60  Lumbar extension: 10/25 with endrange pain  Right lateral flexion: 20/25  Left lateral flexion: 20/25    Reflexes are +2/5 and symmetrical at the Patella and Achilles. Manual muscle testing reveals: 5/5 strength to the LE indicator muscles. Sensation to light touch is WNL to the distal lower extremity dermatomes. Posterior tibial pulses 2/4 B/L. Valsalva's: Negative   Seated SLR's: Negative Bilateral  Woodall's:  Negative Bilateral  Slump:  Negative Bilateral  Prone Lying: Unremarkable  Prone Lying in extension: Mildly provocative    Midline pain: Negative   Taut and Tender fibers lumbar paraspinals B/L  Joint fixation at: L5-S1    Max was seen today for lower back pain. Diagnoses and all orders for this visit:    Chronic right-sided low back pain without sciatica    Lumbar spondylosis        Treatment Plan:   I spoke with him regarding my exam findings and treatment options. I reviewed his x-rays with him today, noting degenerative changes posterior facets at L5-S1 with some mild multilevel spondylosis and DDD. I recommended that we start a trial of conservative care today consisting manipulation, EMS and HEP. I will plan on seeing him 1 times per week for 4 weeks, then reassess to determine response to care and future options. With consent, I did begin today. Problem/Goals  Decrease pain and/or swelling and Increase ROM and/or flexibility    Today's Treatment  Vasquez flexion distraction manipulation at L5, protocol 1. EMS with heat to the lumbar region for 15 minutes to address muscle spasm/hypertension and alleviate pain. Introduced hamstring stretches and Sandie repeated flexion in lying as well as repeated flexion in sitting  I spoke to him regarding posttreatment soreness.  Should any arise, he  may use ice for 10-15 minutes, over-the-counter NSAIDs or topical gels. Seen By:  Keri Erickson DC    * This note was created using voice recognition software.   The note was reviewed, however grammatical errors may exist.

## 2021-06-02 ENCOUNTER — OFFICE VISIT (OUTPATIENT)
Dept: CHIROPRACTIC MEDICINE | Age: 61
End: 2021-06-02
Payer: COMMERCIAL

## 2021-06-02 VITALS — OXYGEN SATURATION: 98 % | TEMPERATURE: 97.8 F | RESPIRATION RATE: 16 BRPM | HEART RATE: 68 BPM

## 2021-06-02 DIAGNOSIS — G89.29 CHRONIC RIGHT-SIDED LOW BACK PAIN WITHOUT SCIATICA: Primary | ICD-10-CM

## 2021-06-02 DIAGNOSIS — M47.816 LUMBAR SPONDYLOSIS: ICD-10-CM

## 2021-06-02 DIAGNOSIS — M54.50 CHRONIC RIGHT-SIDED LOW BACK PAIN WITHOUT SCIATICA: Primary | ICD-10-CM

## 2021-06-02 PROCEDURE — 97014 ELECTRIC STIMULATION THERAPY: CPT | Performed by: CHIROPRACTOR

## 2021-06-02 PROCEDURE — 98940 CHIROPRACT MANJ 1-2 REGIONS: CPT | Performed by: CHIROPRACTOR

## 2021-06-02 NOTE — PROGRESS NOTES
21  Raz Lujan. : 1960 Sex: male  Age: 56 y.o. Chief Complaint   Patient presents with    Lower Back Pain       HPI:   Pain is is unchanged. On average, pain is perceived as mild (1-3  pain scale). States it still pretty dependent on his activities-worse during work-related activities when he is up on his feet. Has continued with his exercises as instructed. No new issues today          Current Outpatient Medications:     Omega-3 Fatty Acids (FISH OIL) 1000 MG CPDR, Take by mouth, Disp: , Rfl:     Multiple Vitamin (MULTIVITAMINS PO), Take by mouth, Disp: , Rfl:     Cholecalciferol (VITAMIN D3) 2000 units TABS, Take by mouth, Disp: , Rfl:     aspirin 81 MG tablet, Take 81 mg by mouth daily, Disp: , Rfl:     Exam:   Vitals:    21 1056   Pulse: 68   Resp: 16   Temp: 97.8 °F (36.6 °C)   SpO2: 98%         There are hypertonic and tender fibers noted today in the bilateral lumbar paraspinal muscles. Joint fixation is noted with motion screening at bilateral SI joints, L4-5. Raz was seen today for lower back pain. Diagnoses and all orders for this visit:    Chronic right-sided low back pain without sciatica    Lumbar spondylosis        Treatment Plan: Continued Vasquez flexion distraction manipulation at L4 today, protocol 1. Diversified manipulation of the SI joints. EMS with heat to the lumbar region for 15 minutes to address muscle spasm/hypertension and alleviate pain. Tolerated well. Continue with home-based stretches.   Follow-up as scheduled for continued care        Seen By:  Shiraz Bill

## 2021-06-09 ENCOUNTER — OFFICE VISIT (OUTPATIENT)
Dept: CHIROPRACTIC MEDICINE | Age: 61
End: 2021-06-09
Payer: COMMERCIAL

## 2021-06-09 VITALS — HEART RATE: 92 BPM | RESPIRATION RATE: 16 BRPM | TEMPERATURE: 98.4 F | OXYGEN SATURATION: 98 %

## 2021-06-09 DIAGNOSIS — M54.50 CHRONIC RIGHT-SIDED LOW BACK PAIN WITHOUT SCIATICA: Primary | ICD-10-CM

## 2021-06-09 DIAGNOSIS — M47.816 LUMBAR SPONDYLOSIS: ICD-10-CM

## 2021-06-09 DIAGNOSIS — G89.29 CHRONIC RIGHT-SIDED LOW BACK PAIN WITHOUT SCIATICA: Primary | ICD-10-CM

## 2021-06-09 PROCEDURE — 98940 CHIROPRACT MANJ 1-2 REGIONS: CPT | Performed by: CHIROPRACTOR

## 2021-06-09 PROCEDURE — 97014 ELECTRIC STIMULATION THERAPY: CPT | Performed by: CHIROPRACTOR

## 2021-06-16 ENCOUNTER — OFFICE VISIT (OUTPATIENT)
Dept: CHIROPRACTIC MEDICINE | Age: 61
End: 2021-06-16
Payer: COMMERCIAL

## 2021-06-16 VITALS — TEMPERATURE: 97.5 F | RESPIRATION RATE: 16 BRPM | HEART RATE: 68 BPM | OXYGEN SATURATION: 98 %

## 2021-06-16 DIAGNOSIS — M47.816 LUMBAR SPONDYLOSIS: ICD-10-CM

## 2021-06-16 DIAGNOSIS — M54.04 PANNICULITIS AFFECTING REGIONS OF NECK AND BACK, THORACIC REGION: ICD-10-CM

## 2021-06-16 DIAGNOSIS — M54.50 CHRONIC RIGHT-SIDED LOW BACK PAIN WITHOUT SCIATICA: Primary | ICD-10-CM

## 2021-06-16 DIAGNOSIS — G89.29 CHRONIC RIGHT-SIDED LOW BACK PAIN WITHOUT SCIATICA: Primary | ICD-10-CM

## 2021-06-16 PROCEDURE — 97014 ELECTRIC STIMULATION THERAPY: CPT | Performed by: CHIROPRACTOR

## 2021-06-16 PROCEDURE — 98940 CHIROPRACT MANJ 1-2 REGIONS: CPT | Performed by: CHIROPRACTOR

## 2021-06-16 NOTE — PROGRESS NOTES
21  Raz Hawthorne. : 1960 Sex: male  Age: 56 y.o. Chief Complaint   Patient presents with    Lower Back Pain       HPI:   Pain is has improved. Pain has varied over the past week and a half. Easier when hes using the crane, worse if he has to do a lot of heavier lifting. Sore  night, but had to move and install an airconditioner so thinks that's what did it. Change in quality of symptoms: no.  He denies any other symptoms. Current Outpatient Medications:     Omega-3 Fatty Acids (FISH OIL) 1000 MG CPDR, Take by mouth, Disp: , Rfl:     Multiple Vitamin (MULTIVITAMINS PO), Take by mouth, Disp: , Rfl:     Cholecalciferol (VITAMIN D3) 2000 units TABS, Take by mouth, Disp: , Rfl:     aspirin 81 MG tablet, Take 81 mg by mouth daily, Disp: , Rfl:     Exam:   Vitals:    21 1126   Pulse: 68   Resp: 16   Temp: 97.5 °F (36.4 °C)   SpO2: 98%     Still has some limitation with extension activities, particularly in prone lying, repeated extensions in lying positions. There are hypertonic and tender fibers noted today in the bilateral lumbar and thoracic paraspinal muscles. Joint fixation is noted with motion screening at T8-9, T12-L1, 4-5    Max was seen today for lower back pain. Diagnoses and all orders for this visit:    Chronic right-sided low back pain without sciatica    Lumbar spondylosis    Panniculitis affecting regions of neck and back, thoracic region        Treatment Plan:  EMS with heat to the lumbar region for 15 minutes to address muscle spasm/hypertension and alleviate pain. Diversified manipulation to the listed  thoracic, lumbar segments. Continue with HEP. Follow-up with me in approximately 1 month or as needed for further care.   He has a high co-pay and would prefer to see how things go with a longer interval between visits        Seen By:  Jesus Guerrero

## 2021-07-15 ENCOUNTER — OFFICE VISIT (OUTPATIENT)
Dept: FAMILY MEDICINE CLINIC | Age: 61
End: 2021-07-15
Payer: COMMERCIAL

## 2021-07-15 VITALS
DIASTOLIC BLOOD PRESSURE: 82 MMHG | TEMPERATURE: 97.7 F | BODY MASS INDEX: 20.27 KG/M2 | SYSTOLIC BLOOD PRESSURE: 126 MMHG | OXYGEN SATURATION: 98 % | HEART RATE: 68 BPM | HEIGHT: 75 IN | WEIGHT: 163 LBS

## 2021-07-15 DIAGNOSIS — Z12.5 SCREENING FOR MALIGNANT NEOPLASM OF PROSTATE: ICD-10-CM

## 2021-07-15 DIAGNOSIS — Z00.00 ROUTINE GENERAL MEDICAL EXAMINATION AT A HEALTH CARE FACILITY: Primary | ICD-10-CM

## 2021-07-15 DIAGNOSIS — E78.2 MIXED HYPERLIPIDEMIA: ICD-10-CM

## 2021-07-15 DIAGNOSIS — M54.50 ACUTE BILATERAL LOW BACK PAIN WITHOUT SCIATICA: ICD-10-CM

## 2021-07-15 DIAGNOSIS — Z00.00 ROUTINE GENERAL MEDICAL EXAMINATION AT A HEALTH CARE FACILITY: ICD-10-CM

## 2021-07-15 DIAGNOSIS — H61.23 BILATERAL IMPACTED CERUMEN: ICD-10-CM

## 2021-07-15 LAB
ALBUMIN SERPL-MCNC: 4.1 G/DL (ref 3.5–5.2)
ALP BLD-CCNC: 57 U/L (ref 40–129)
ALT SERPL-CCNC: 18 U/L (ref 0–40)
ANION GAP SERPL CALCULATED.3IONS-SCNC: 17 MMOL/L (ref 7–16)
AST SERPL-CCNC: 17 U/L (ref 0–39)
BACTERIA: ABNORMAL /HPF
BASOPHILS ABSOLUTE: 0.04 E9/L (ref 0–0.2)
BASOPHILS RELATIVE PERCENT: 0.7 % (ref 0–2)
BILIRUB SERPL-MCNC: 0.6 MG/DL (ref 0–1.2)
BILIRUBIN URINE: NEGATIVE
BLOOD, URINE: NEGATIVE
BUN BLDV-MCNC: 14 MG/DL (ref 6–23)
CALCIUM SERPL-MCNC: 9.8 MG/DL (ref 8.6–10.2)
CHLORIDE BLD-SCNC: 104 MMOL/L (ref 98–107)
CHOLESTEROL, FASTING: 215 MG/DL (ref 0–199)
CLARITY: CLEAR
CO2: 21 MMOL/L (ref 22–29)
COLOR: YELLOW
CREAT SERPL-MCNC: 0.9 MG/DL (ref 0.7–1.2)
EOSINOPHILS ABSOLUTE: 0.09 E9/L (ref 0.05–0.5)
EOSINOPHILS RELATIVE PERCENT: 1.6 % (ref 0–6)
GFR AFRICAN AMERICAN: >60
GFR NON-AFRICAN AMERICAN: >60 ML/MIN/1.73
GLUCOSE BLD-MCNC: 86 MG/DL (ref 74–99)
GLUCOSE URINE: NEGATIVE MG/DL
HCT VFR BLD CALC: 44.2 % (ref 37–54)
HDLC SERPL-MCNC: 87 MG/DL
HEMOGLOBIN: 14.3 G/DL (ref 12.5–16.5)
IMMATURE GRANULOCYTES #: 0.02 E9/L
IMMATURE GRANULOCYTES %: 0.4 % (ref 0–5)
KETONES, URINE: NEGATIVE MG/DL
LDL CHOLESTEROL CALCULATED: 114 MG/DL (ref 0–99)
LEUKOCYTE ESTERASE, URINE: ABNORMAL
LYMPHOCYTES ABSOLUTE: 1.63 E9/L (ref 1.5–4)
LYMPHOCYTES RELATIVE PERCENT: 28.9 % (ref 20–42)
MAGNESIUM: 2.2 MG/DL (ref 1.6–2.6)
MCH RBC QN AUTO: 28.7 PG (ref 26–35)
MCHC RBC AUTO-ENTMCNC: 32.4 % (ref 32–34.5)
MCV RBC AUTO: 88.8 FL (ref 80–99.9)
MONOCYTES ABSOLUTE: 0.56 E9/L (ref 0.1–0.95)
MONOCYTES RELATIVE PERCENT: 9.9 % (ref 2–12)
NEUTROPHILS ABSOLUTE: 3.3 E9/L (ref 1.8–7.3)
NEUTROPHILS RELATIVE PERCENT: 58.5 % (ref 43–80)
NITRITE, URINE: NEGATIVE
PDW BLD-RTO: 13.9 FL (ref 11.5–15)
PH UA: 5.5 (ref 5–9)
PLATELET # BLD: 197 E9/L (ref 130–450)
PMV BLD AUTO: 10.9 FL (ref 7–12)
POTASSIUM SERPL-SCNC: 4.1 MMOL/L (ref 3.5–5)
PROSTATE SPECIFIC ANTIGEN: 0.98 NG/ML (ref 0–4)
PROTEIN UA: NEGATIVE MG/DL
RBC # BLD: 4.98 E12/L (ref 3.8–5.8)
RBC UA: ABNORMAL /HPF (ref 0–2)
SODIUM BLD-SCNC: 142 MMOL/L (ref 132–146)
SPECIFIC GRAVITY UA: 1.02 (ref 1–1.03)
TOTAL PROTEIN: 6.3 G/DL (ref 6.4–8.3)
TRIGLYCERIDE, FASTING: 71 MG/DL (ref 0–149)
TSH SERPL DL<=0.05 MIU/L-ACNC: 2.14 UIU/ML (ref 0.27–4.2)
UROBILINOGEN, URINE: 0.2 E.U./DL
VLDLC SERPL CALC-MCNC: 14 MG/DL
WBC # BLD: 5.6 E9/L (ref 4.5–11.5)
WBC UA: ABNORMAL /HPF (ref 0–5)

## 2021-07-15 PROCEDURE — 69210 REMOVE IMPACTED EAR WAX UNI: CPT | Performed by: INTERNAL MEDICINE

## 2021-07-15 PROCEDURE — 99396 PREV VISIT EST AGE 40-64: CPT | Performed by: INTERNAL MEDICINE

## 2021-07-15 ASSESSMENT — ENCOUNTER SYMPTOMS
SHORTNESS OF BREATH: 0
CHEST TIGHTNESS: 0
ABDOMINAL PAIN: 0
CONSTIPATION: 0
DIARRHEA: 0
BLOOD IN STOOL: 0
COUGH: 0
SORE THROAT: 0
EYE PAIN: 0
VOMITING: 0
NAUSEA: 0
RHINORRHEA: 0

## 2021-07-15 NOTE — PROGRESS NOTES
[facility]) Physicians   Internal Medicine     7/15/2021  Raz Edward Cea. : 1960 Sex: male  Age:59 y.o. Chief Complaint   Patient presents with    Annual Exam    Hyperlipidemia        HPI:   Patient presents to office for evaluation of the following medical concerns. - States low back pain. States has seen chiropracter for 1 month. States doing some exercises. Improved but not resolved. States lifting makes worse. States right sided lateral paraspinal muscles. States will be an ache and other times is sharp. Currently pain is 0/10. States still if sitting for prolonged times and is relieved if walks around. No numbness, tingling or weakness.    - States cerumen impaction. States had ENT in past.  States ear discomfort and hearing changes improved. - States syncope. No further passing out since last visit. has seen cardiology and EP. States had tilt table (). States was positive for neurocardiogenic syncope. No further passing out or blacking out. States no changes. States trying to stay hydrated. - Has mitral valve prolapse - follows with cardiology yearly - last visit 3/2018. Had echo 5094 - Showed diastolic dysfunction and left vent hypertrophy - no valve disease. Follow up yearly. - States has some chronic aches to joints. States seem to move and hurts somewhere different locations on different days. Not taking anything for pain. - States has high cholesterol. Trying to watch diet. Not currently on medications.     - States having decreased urine flow, sates increased frequency. No nocturia.      Health Maintenance   - immunizations:   Influenza Vaccination  - (2013), (2015), (2016), (2017), (2018), (2019), (2020)   Pneumonia Vaccination  Zoster/Shingles Vaccine - (10/2020) #1, (2020) #2 - shingrix  Tetanus Vaccination (2018) - tdap [facility]   covid (3/12/2021) #1, (2021) #2 - moderna     - Screenings:   Colonoscopy - (2016) - tortuous - repeat 8-10yrs  Prostate tilt table - (2015) - positive for neurocardiogenic syncope    2D ECHO - (3/2017) - stage II diastolic dysfunction, mild LVH    ROS:  Review of Systems   Constitutional: Negative for appetite change, chills, fever and unexpected weight change. HENT: Negative for congestion, rhinorrhea and sore throat. Eyes: Negative for pain and visual disturbance. Respiratory: Negative for cough, chest tightness and shortness of breath. Cardiovascular: Negative for chest pain and palpitations. Gastrointestinal: Negative for abdominal pain, blood in stool, constipation, diarrhea, nausea and vomiting. Genitourinary: Positive for frequency. Negative for difficulty urinating, dysuria, hematuria, scrotal swelling, testicular pain and urgency. Musculoskeletal: Negative for arthralgias and gait problem. Skin: Negative for rash. Neurological: Negative for dizziness, syncope, weakness, light-headedness and headaches. Hematological: Negative for adenopathy. Does not bruise/bleed easily. Psychiatric/Behavioral: Negative for suicidal ideas. The patient is not nervous/anxious.           Current Outpatient Medications:     Omega-3 Fatty Acids (FISH OIL) 1000 MG CPDR, Take by mouth three times a week , Disp: , Rfl:     Multiple Vitamin (MULTIVITAMINS PO), Take by mouth three times a week , Disp: , Rfl:     Cholecalciferol (VITAMIN D3) 2000 units TABS, Take by mouth, Disp: , Rfl:     aspirin 81 MG tablet, Take 81 mg by mouth daily, Disp: , Rfl:     Allergies   Allergen Reactions    Penicillins Rash       Past Medical History:   Diagnosis Date    Hyperlipidemia     Mitral valve prolapse     Mononucleosis     Syncope June 2015       Past Surgical History:   Procedure Laterality Date    CYST REMOVAL Right     right leg       Family History   Problem Relation Age of Onset    Hyperthyroidism Mother     High Blood Pressure Mother     Thyroid Disease Mother     Atrial Fibrillation Father     High Cholesterol Father     Heart Disease Father     Diabetes Father         Non Insulin Dependent        Social History     Socioeconomic History    Marital status:      Spouse name: Not on file    Number of children: Not on file    Years of education: Not on file    Highest education level: Not on file   Occupational History    Not on file   Tobacco Use    Smoking status: Former Smoker     Types: Cigars    Smokeless tobacco: Current User     Types: Chew    Tobacco comment: Former cigar smoker. Substance and Sexual Activity    Alcohol use: Yes     Comment: Occassionally on weekends    Drug use: Not on file    Sexual activity: Not on file   Other Topics Concern    Not on file   Social History Narrative    Not on file     Social Determinants of Health     Financial Resource Strain:     Difficulty of Paying Living Expenses:    Food Insecurity:     Worried About Running Out of Food in the Last Year:     [address] in the Last Year:    Transportation Needs:     Lack of Transportation (Medical):  Lack of Transportation (Non-Medical):    Physical Activity:     Days of Exercise per Week:     Minutes of Exercise per Session:    Stress:     Feeling of Stress :    Social Connections:     Frequency of Communication with Friends and Family:     Frequency of Social Gatherings with Friends and Family:     Attends Nondenominational Services:     Active Member of Clubs or Organizations:     Attends Club or Organization Meetings:     Marital Status:    Intimate Partner Violence:     Fear of Current or Ex-Partner:     Emotionally Abused:     Physically Abused:     Sexually Abused:        Vitals:    07/15/21 0853   BP: 126/82   Site: Left Upper Arm   Position: Sitting   Cuff Size: Medium Adult   Pulse: 68   Temp: 97.7 °F (36.5 °C)   SpO2: 98%   Weight: 163 lb (73.9 kg)   Height: 6' 3\" (1.905 m)       Exam:  Physical Exam  Constitutional:       Appearance: He is well-developed.    HENT:      Head: Normocephalic and atraumatic. Right Ear: External ear normal. There is impacted cerumen. Left Ear: External ear normal.   Eyes:      Pupils: Pupils are equal, round, and reactive to light. Neck:      Thyroid: No thyromegaly. Cardiovascular:      Rate and Rhythm: Normal rate and regular rhythm. Heart sounds: Normal heart sounds. No murmur heard. Pulmonary:      Effort: Pulmonary effort is normal.      Breath sounds: Normal breath sounds. No wheezing or rales. Abdominal:      General: Bowel sounds are normal.      Palpations: Abdomen is soft. Tenderness: There is no abdominal tenderness. There is no guarding or rebound. Genitourinary:     Prostate: Normal.   Musculoskeletal:         General: Normal range of motion. Cervical back: Normal range of motion and neck supple. Lymphadenopathy:      Cervical: No cervical adenopathy. Skin:     General: Skin is warm and dry. Neurological:      Mental Status: He is alert and oriented to person, place, and time. Cranial Nerves: No cranial nerve deficit.    Psychiatric:         Judgment: Judgment normal.         Assessment and Plan:    Diagnoses and all orders for this visit:    Routine general medical examination at a health care facility  Health Maintenance   - immunizations:   Influenza Vaccination  - (2013), (2015), (2016), (2017), (2018), (2019), (2020)   Pneumonia Vaccination  Zoster/Shingles Vaccine - (10/2020) #1, (12/5/2020) #2 - shingrix  Tetanus Vaccination (2018) - tdap [facility]   covid (3/12/2021) #1, (4/9/2021) #2 - moderna     - Screenings:   Colonoscopy - (4/2016) - tortuous - repeat 8-10yrs  Prostate     Acute bilateral low back pain without sciatica  - poss stain and sprain   - xray lumbar spine completed (2021)   - heat alternate with ice   - tylenol or nasids as needed   - homer exercises - handouts provided  - following with chiropractor  - improved     Neurocardiogenic syncope  - neurocardiogenic syncope per tilt (2015)  - avoid dehydration  - aspirin    Mixed hyperlipidemia  - watch diet  - calculate 10 year risk   - not currently on medications     Mitral valve prolapse  - mitral valve prolapse  - follows with cardiology yearly (7/2019)     Vitamin D deficiency  - continue present treatment  - on otc vitamin D    Bilateral impacted cerumen  - cerumen removed via curette b/l   - mild trauma noted to right ear, canal open post removal b/l   - no lavage     Return in about 1 year (around 7/15/2022) for check up and review.     Orders Placed This Encounter   Procedures    Comprehensive Metabolic Panel     Standing Status:   Future     Standing Expiration Date:   7/15/2022    Magnesium     Standing Status:   Future     Standing Expiration Date:   7/15/2022    Lipid, Fasting     Standing Status:   Future     Standing Expiration Date:   7/15/2022    TSH without Reflex     Standing Status:   Future     Standing Expiration Date:   7/15/2022    Urinalysis     Standing Status:   Future     Standing Expiration Date:   7/15/2022    PSA screening     Standing Status:   Future     Standing Expiration Date:   7/15/2022    CBC Auto Differential     Standing Status:   Future     Standing Expiration Date:   7/15/2022    NH REMOVAL IMPACTED CERUMEN INSTRUMENTATION UNILAT     Requested Prescriptions      No prescriptions requested or ordered in this encounter         Torito Martin MD  7/15/2021  9:30 AM

## 2021-07-16 ENCOUNTER — TELEPHONE (OUTPATIENT)
Dept: FAMILY MEDICINE CLINIC | Age: 61
End: 2021-07-16

## 2021-07-16 NOTE — TELEPHONE ENCOUNTER
Please let the patient know that blood work results showed    Bicarbonate level was borderline low nonspecific and of uncertain cause    Protein level was mildly low    Cholesterol levels were still elevated, values are similar when compared to previous labs. HDL or good cholesterol was elevated and beneficial range. The 10-year ASCVD risk score (Megha Cobos, et al., 2013) is: 7.3%, which is borderline intermittent high risk for heart vascular complications in the next 10 years. Recommending diet and exercise.   May need to consider statin medications in the near future    Thyroid labs were normal    PSA for prostate was normal and similar when compared to previous    Urinalysis showed slight white cells and bacteria but otherwise was normal    Blood counts were normal    Other electrolytes, liver, and kidney function values were normal    Thanks

## 2021-07-21 ENCOUNTER — OFFICE VISIT (OUTPATIENT)
Dept: CHIROPRACTIC MEDICINE | Age: 61
End: 2021-07-21
Payer: COMMERCIAL

## 2021-07-21 VITALS — TEMPERATURE: 97 F | RESPIRATION RATE: 16 BRPM | OXYGEN SATURATION: 97 % | HEART RATE: 69 BPM

## 2021-07-21 DIAGNOSIS — G89.29 CHRONIC RIGHT-SIDED LOW BACK PAIN WITHOUT SCIATICA: Primary | ICD-10-CM

## 2021-07-21 DIAGNOSIS — M53.3 SACROCOCCYGEAL DISORDERS, NOT ELSEWHERE CLASSIFIED: ICD-10-CM

## 2021-07-21 DIAGNOSIS — M54.50 CHRONIC RIGHT-SIDED LOW BACK PAIN WITHOUT SCIATICA: Primary | ICD-10-CM

## 2021-07-21 DIAGNOSIS — M54.04 PANNICULITIS AFFECTING REGIONS OF NECK AND BACK, THORACIC REGION: ICD-10-CM

## 2021-07-21 DIAGNOSIS — M47.816 LUMBAR SPONDYLOSIS: ICD-10-CM

## 2021-07-21 PROCEDURE — 99999 PR OFFICE/OUTPT VISIT,PROCEDURE ONLY: CPT | Performed by: CHIROPRACTOR

## 2021-07-21 PROCEDURE — 97014 ELECTRIC STIMULATION THERAPY: CPT | Performed by: CHIROPRACTOR

## 2021-07-21 PROCEDURE — 98941 CHIROPRACT MANJ 3-4 REGIONS: CPT | Performed by: CHIROPRACTOR

## 2021-07-21 NOTE — PROGRESS NOTES
21  Raz Wu. : 1960 Sex: male  Age: 56 y.o. Chief Complaint   Patient presents with    Lower Back Pain       HPI:   Pain is has improved. On average, pain is perceived as mild (1-3  pain scale). States he will get some increased back pain toward the end of his shift, particularly late in the week. If he does his home stretches it seems to go away. He is generally pain-free when he wakes up. He has no new symptoms for me today. Current Outpatient Medications:     Omega-3 Fatty Acids (FISH OIL) 1000 MG CPDR, Take by mouth three times a week , Disp: , Rfl:     Multiple Vitamin (MULTIVITAMINS PO), Take by mouth three times a week , Disp: , Rfl:     Cholecalciferol (VITAMIN D3) 2000 units TABS, Take by mouth, Disp: , Rfl:     aspirin 81 MG tablet, Take 81 mg by mouth daily, Disp: , Rfl:     Exam:   Vitals:    21 1122   Pulse: 69   Resp: 16   Temp: 97 °F (36.1 °C)   SpO2: 97%         There are hypertonic and tender fibers noted today in the bilateral lumbar and thoracic paraspinal muscles. Joint fixation is noted with motion screening at bilateral SI joints, L5-S1, T12-L1 and T 6-8. Raz was seen today for lower back pain. Diagnoses and all orders for this visit:    Chronic right-sided low back pain without sciatica    Lumbar spondylosis    Panniculitis affecting regions of neck and back, thoracic region        Treatment Plan: Continued with diversified manipulation to listed segments in thoracic, lumbar and SI region. EMS with heat to the lumbar region for 15 minutes to address muscle spasm/hypertension and alleviate pain. Added supine bridging 3x10 to his HEP. He can progress this to 3x20 over the next month. Tolerated treatment well.   I will see him back 1 month for continued care, sooner should symptoms worsen        Seen By:  Malcolm Butler

## 2021-07-27 ENCOUNTER — OFFICE VISIT (OUTPATIENT)
Dept: FAMILY MEDICINE CLINIC | Age: 61
End: 2021-07-27
Payer: COMMERCIAL

## 2021-07-27 VITALS
HEART RATE: 83 BPM | OXYGEN SATURATION: 96 % | RESPIRATION RATE: 18 BRPM | WEIGHT: 164 LBS | DIASTOLIC BLOOD PRESSURE: 80 MMHG | SYSTOLIC BLOOD PRESSURE: 130 MMHG | TEMPERATURE: 97.4 F | BODY MASS INDEX: 20.39 KG/M2 | HEIGHT: 75 IN

## 2021-07-27 DIAGNOSIS — S00.05XA EMBEDDED TICK OF SCALP, INITIAL ENCOUNTER: Primary | ICD-10-CM

## 2021-07-27 DIAGNOSIS — Z18.39 EMBEDDED TICK OF SCALP, INITIAL ENCOUNTER: Primary | ICD-10-CM

## 2021-07-27 PROCEDURE — 99213 OFFICE O/P EST LOW 20 MIN: CPT | Performed by: FAMILY MEDICINE

## 2021-07-27 RX ORDER — DOXYCYCLINE HYCLATE 100 MG
TABLET ORAL
Qty: 2 TABLET | Refills: 0 | Status: SHIPPED
Start: 2021-07-27 | End: 2022-07-21

## 2021-07-27 ASSESSMENT — ENCOUNTER SYMPTOMS
SHORTNESS OF BREATH: 0
CHEST TIGHTNESS: 0
EYE PAIN: 0
ROS SKIN COMMENTS: SCALP
COUGH: 0
EYE DISCHARGE: 0
DIARRHEA: 0
BLOOD IN STOOL: 0
PHOTOPHOBIA: 0
EYE REDNESS: 0
ALLERGIC/IMMUNOLOGIC NEGATIVE: 1
SINUS PAIN: 0
TROUBLE SWALLOWING: 0
NAUSEA: 0
SORE THROAT: 0
BACK PAIN: 0
VOMITING: 0
ABDOMINAL PAIN: 0

## 2021-07-27 NOTE — LETTER
[address] [address]  Phone: [phone]  Fax: [address] [facility],         July 27, 2021     Patient: Alonzo Rutherford. YOB: 1960   Date of Visit: 7/27/2021       To Whom It May Concern: It is my medical opinion that Magdy Wright may return to full duty immediately with no restrictions. If you have any questions or concerns, please don't hesitate to call.     Sincerely,        Mitzy Anne, DO

## 2021-07-27 NOTE — PROGRESS NOTES
(MULTIVITAMINS PO), Take by mouth three times a week , Disp: , Rfl:     Cholecalciferol (VITAMIN D3) 2000 units TABS, Take by mouth, Disp: , Rfl:     aspirin 81 MG tablet, Take 81 mg by mouth daily, Disp: , Rfl:   Allergies   Allergen Reactions    Penicillins Rash       Past Medical History:   Diagnosis Date    Hyperlipidemia     Mitral valve prolapse     Mononucleosis     Syncope June 2015     Past Surgical History:   Procedure Laterality Date    CYST REMOVAL Right     right leg     Family History   Problem Relation Age of Onset    Hyperthyroidism Mother     High Blood Pressure Mother     Thyroid Disease Mother     Atrial Fibrillation Father     High Cholesterol Father     Heart Disease Father     Diabetes Father         Non Insulin Dependent      Social History     Socioeconomic History    Marital status:      Spouse name: Not on file    Number of children: Not on file    Years of education: Not on file    Highest education level: Not on file   Occupational History    Not on file   Tobacco Use    Smoking status: Former Smoker     Types: Cigars    Smokeless tobacco: Current User     Types: Chew    Tobacco comment: Former cigar smoker. Substance and Sexual Activity    Alcohol use: Yes     Comment: Occassionally on weekends    Drug use: Not on file    Sexual activity: Not on file   Other Topics Concern    Not on file   Social History Narrative    Not on file     Social Determinants of Health     Financial Resource Strain:     Difficulty of Paying Living Expenses:    Food Insecurity:     Worried About Running Out of Food in the Last Year:     [address] in the Last Year:    Transportation Needs:     Lack of Transportation (Medical):      Lack of Transportation (Non-Medical):    Physical Activity:     Days of Exercise per Week:     Minutes of Exercise per Session:    Stress:     Feeling of Stress :    Social Connections:     Frequency of Communication with Friends and Coordination: Coordination normal.      Deep Tendon Reflexes: Reflexes normal.             Seen By:  Shiva Finch DO

## 2021-09-01 ENCOUNTER — OFFICE VISIT (OUTPATIENT)
Dept: CHIROPRACTIC MEDICINE | Age: 61
End: 2021-09-01
Payer: COMMERCIAL

## 2021-09-01 VITALS — TEMPERATURE: 97 F | HEART RATE: 65 BPM | OXYGEN SATURATION: 98 %

## 2021-09-01 DIAGNOSIS — M53.3 SACROCOCCYGEAL DISORDERS, NOT ELSEWHERE CLASSIFIED: ICD-10-CM

## 2021-09-01 DIAGNOSIS — G89.29 CHRONIC RIGHT-SIDED LOW BACK PAIN WITHOUT SCIATICA: Primary | ICD-10-CM

## 2021-09-01 DIAGNOSIS — M47.816 LUMBAR SPONDYLOSIS: ICD-10-CM

## 2021-09-01 DIAGNOSIS — M54.50 CHRONIC RIGHT-SIDED LOW BACK PAIN WITHOUT SCIATICA: Primary | ICD-10-CM

## 2021-09-01 DIAGNOSIS — M54.04 PANNICULITIS AFFECTING REGIONS OF NECK AND BACK, THORACIC REGION: ICD-10-CM

## 2021-09-01 PROCEDURE — 98940 CHIROPRACT MANJ 1-2 REGIONS: CPT | Performed by: CHIROPRACTOR

## 2021-09-01 PROCEDURE — 97014 ELECTRIC STIMULATION THERAPY: CPT | Performed by: CHIROPRACTOR

## 2021-09-01 NOTE — PROGRESS NOTES
21  Raz Ball. : 1960 Sex: male  Age: 59 y.o. Chief Complaint   Patient presents with    Lower Back Pain       HPI:   Doing reasonably well with HEP. Added some leg lifts as well. He states his back pain is stiff or tight first thing in the morning upon waking. When he goes to work, after a few hours it tightens up to about 3-4/10 pain. When he gets home at night he will do his exercises which help. No new issues today. Current Outpatient Medications:     doxycycline hyclate (VIBRA-TABS) 100 MG tablet, Take both at once, Disp: 2 tablet, Rfl: 0    Omega-3 Fatty Acids (FISH OIL) 1000 MG CPDR, Take by mouth three times a week , Disp: , Rfl:     Multiple Vitamin (MULTIVITAMINS PO), Take by mouth three times a week , Disp: , Rfl:     Cholecalciferol (VITAMIN D3) 2000 units TABS, Take by mouth, Disp: , Rfl:     aspirin 81 MG tablet, Take 81 mg by mouth daily, Disp: , Rfl:     Exam:   Vitals:    21 1054   Pulse: 65   Temp: 97 °F (36.1 °C)   SpO2: 98%       Trigger point bilateral quadratus lumborum  There are hypertonic and tender fibers noted today in the thoracic and lumbar paraspinal muscles. Joint fixation is noted with motion screening at T10-11, L3-4, bilateral SI joints. Raz was seen today for lower back pain. Diagnoses and all orders for this visit:    Chronic right-sided low back pain without sciatica    Lumbar spondylosis    Panniculitis affecting regions of neck and back, thoracic region    Sacrococcygeal disorders, not elsewhere classified        Treatment Plan:  EMS with heat to the lumbar region for 15 minutes to address muscle spasm/hypertension and alleviate pain. Diversified manipulation to the listed thoracic, lumbar, pelvic segments. Reviewed HEP. Added some supine bridge isometrics. With single-leg bridge, he gets some cramping, he will try to add some of these in over the next month.         Seen By:  Gilma Dailey DC

## 2021-10-13 ENCOUNTER — OFFICE VISIT (OUTPATIENT)
Dept: CHIROPRACTIC MEDICINE | Age: 61
End: 2021-10-13
Payer: COMMERCIAL

## 2021-10-13 VITALS — TEMPERATURE: 98 F | HEART RATE: 66 BPM | OXYGEN SATURATION: 97 % | RESPIRATION RATE: 16 BRPM

## 2021-10-13 DIAGNOSIS — G89.29 CHRONIC RIGHT-SIDED LOW BACK PAIN WITHOUT SCIATICA: Primary | ICD-10-CM

## 2021-10-13 DIAGNOSIS — M47.816 LUMBAR SPONDYLOSIS: ICD-10-CM

## 2021-10-13 DIAGNOSIS — M54.04 PANNICULITIS AFFECTING REGIONS OF NECK AND BACK, THORACIC REGION: ICD-10-CM

## 2021-10-13 DIAGNOSIS — M53.3 SACROCOCCYGEAL DISORDERS, NOT ELSEWHERE CLASSIFIED: ICD-10-CM

## 2021-10-13 DIAGNOSIS — M54.50 CHRONIC RIGHT-SIDED LOW BACK PAIN WITHOUT SCIATICA: Primary | ICD-10-CM

## 2021-10-13 PROCEDURE — 98941 CHIROPRACT MANJ 3-4 REGIONS: CPT | Performed by: CHIROPRACTOR

## 2021-10-13 PROCEDURE — 97014 ELECTRIC STIMULATION THERAPY: CPT | Performed by: CHIROPRACTOR

## 2021-10-13 NOTE — PROGRESS NOTES
10/13/21  Raz Schulz. : 1960 Sex: male  Age: 59 y.o. Chief Complaint   Patient presents with    Lower Back Pain       HPI:   Pain is has improved. Has improved. Feels that the exercises are helping. Happy with his progress. Describing tightness and stiffness in his lower back today. No new issues        Current Outpatient Medications:     doxycycline hyclate (VIBRA-TABS) 100 MG tablet, Take both at once, Disp: 2 tablet, Rfl: 0    Omega-3 Fatty Acids (FISH OIL) 1000 MG CPDR, Take by mouth three times a week , Disp: , Rfl:     Multiple Vitamin (MULTIVITAMINS PO), Take by mouth three times a week , Disp: , Rfl:     Cholecalciferol (VITAMIN D3) 2000 units TABS, Take by mouth, Disp: , Rfl:     aspirin 81 MG tablet, Take 81 mg by mouth daily, Disp: , Rfl:     Exam:   Vitals:    10/13/21 1054   Pulse: 66   Resp: 16   Temp: 98 °F (36.7 °C)   SpO2: 97%       SLR testing is negative. No midline pain. No sciatic notch tenderness today. There are hypertonic and tender fibers noted today in the bilateral lumbar paraspinal muscles. Joint fixation is noted with motion screening at T7-9, L4-S1, bilateral SI joints. Raz was seen today for lower back pain. Diagnoses and all orders for this visit:    Chronic right-sided low back pain without sciatica    Lumbar spondylosis    Sacrococcygeal disorders, not elsewhere classified    Panniculitis affecting regions of neck and back, thoracic region        Treatment Plan:  EMS with heat to the lumbar region for 15 minutes to address muscle spasm/hypertension and alleviate pain. Diversified manipulation to the listed thoracic, lumbar, pelvic segments.     Update his HEP to include home sheet from [employer] \"spine conditioning program\"  F/U PRN        Seen By:  Anthony Blanco

## 2022-06-14 ENCOUNTER — TELEPHONE (OUTPATIENT)
Dept: FAMILY MEDICINE CLINIC | Age: 62
End: 2022-06-14

## 2022-06-14 DIAGNOSIS — E78.2 MIXED HYPERLIPIDEMIA: ICD-10-CM

## 2022-06-14 DIAGNOSIS — Z12.5 SCREENING FOR MALIGNANT NEOPLASM OF PROSTATE: ICD-10-CM

## 2022-06-14 DIAGNOSIS — Z00.00 ROUTINE GENERAL MEDICAL EXAMINATION AT A HEALTH CARE FACILITY: Primary | ICD-10-CM

## 2022-06-14 NOTE — TELEPHONE ENCOUNTER
----- Message from Junaid Díaz sent at 6/14/2022  1:46 PM EDT -----  Subject: Referral Request    QUESTIONS   Reason for referral request? Pt would like labs done prior to his Physical   needs cholesterol checked   Has the physician seen you for this condition before? No   Preferred Specialist (if applicable)? Do you already have an appointment scheduled? No  Additional Information for Provider? Marry Ramirez is best unless he   can come in on Saturday the 16th   ---------------------------------------------------------------------------  --------------  CALL BACK INFO  What is the best way for the office to contact you? OK to leave message on   voicemail  Preferred Call Back Phone Number? [phone]  ---------------------------------------------------------------------------  --------------  SCRIPT ANSWERS  Relationship to Patient?  Self

## 2022-07-15 DIAGNOSIS — Z12.5 SCREENING FOR MALIGNANT NEOPLASM OF PROSTATE: ICD-10-CM

## 2022-07-15 DIAGNOSIS — E78.2 MIXED HYPERLIPIDEMIA: ICD-10-CM

## 2022-07-15 DIAGNOSIS — Z00.00 ROUTINE GENERAL MEDICAL EXAMINATION AT A HEALTH CARE FACILITY: ICD-10-CM

## 2022-07-15 LAB
ALBUMIN SERPL-MCNC: 4.5 G/DL (ref 3.5–5.2)
ALP BLD-CCNC: 63 U/L (ref 40–129)
ALT SERPL-CCNC: 18 U/L (ref 0–40)
ANION GAP SERPL CALCULATED.3IONS-SCNC: 17 MMOL/L (ref 7–16)
AST SERPL-CCNC: 18 U/L (ref 0–39)
BASOPHILS ABSOLUTE: 0.03 E9/L (ref 0–0.2)
BASOPHILS RELATIVE PERCENT: 0.6 % (ref 0–2)
BILIRUB SERPL-MCNC: 0.8 MG/DL (ref 0–1.2)
BILIRUBIN URINE: NEGATIVE
BLOOD, URINE: NEGATIVE
BUN BLDV-MCNC: 14 MG/DL (ref 6–23)
CALCIUM SERPL-MCNC: 9.8 MG/DL (ref 8.6–10.2)
CHLORIDE BLD-SCNC: 108 MMOL/L (ref 98–107)
CHOLESTEROL, FASTING: 232 MG/DL (ref 0–199)
CLARITY: CLEAR
CO2: 21 MMOL/L (ref 22–29)
COLOR: YELLOW
CREAT SERPL-MCNC: 1 MG/DL (ref 0.7–1.2)
EOSINOPHILS ABSOLUTE: 0.08 E9/L (ref 0.05–0.5)
EOSINOPHILS RELATIVE PERCENT: 1.6 % (ref 0–6)
GFR AFRICAN AMERICAN: >60
GFR NON-AFRICAN AMERICAN: >60 ML/MIN/1.73
GLUCOSE BLD-MCNC: 92 MG/DL (ref 74–99)
GLUCOSE URINE: NEGATIVE MG/DL
HCT VFR BLD CALC: 42.6 % (ref 37–54)
HDLC SERPL-MCNC: 86 MG/DL
HEMOGLOBIN: 13.7 G/DL (ref 12.5–16.5)
IMMATURE GRANULOCYTES #: 0.01 E9/L
IMMATURE GRANULOCYTES %: 0.2 % (ref 0–5)
KETONES, URINE: NEGATIVE MG/DL
LDL CHOLESTEROL CALCULATED: 132 MG/DL (ref 0–99)
LEUKOCYTE ESTERASE, URINE: NEGATIVE
LYMPHOCYTES ABSOLUTE: 1.67 E9/L (ref 1.5–4)
LYMPHOCYTES RELATIVE PERCENT: 32.6 % (ref 20–42)
MAGNESIUM: 2.3 MG/DL (ref 1.6–2.6)
MCH RBC QN AUTO: 28.5 PG (ref 26–35)
MCHC RBC AUTO-ENTMCNC: 32.2 % (ref 32–34.5)
MCV RBC AUTO: 88.8 FL (ref 80–99.9)
MONOCYTES ABSOLUTE: 0.52 E9/L (ref 0.1–0.95)
MONOCYTES RELATIVE PERCENT: 10.1 % (ref 2–12)
NEUTROPHILS ABSOLUTE: 2.82 E9/L (ref 1.8–7.3)
NEUTROPHILS RELATIVE PERCENT: 54.9 % (ref 43–80)
NITRITE, URINE: NEGATIVE
PDW BLD-RTO: 13.3 FL (ref 11.5–15)
PH UA: 5.5 (ref 5–9)
PLATELET # BLD: 183 E9/L (ref 130–450)
PMV BLD AUTO: 11 FL (ref 7–12)
POTASSIUM SERPL-SCNC: 4.1 MMOL/L (ref 3.5–5)
PROSTATE SPECIFIC ANTIGEN: 0.94 NG/ML (ref 0–4)
PROTEIN UA: NEGATIVE MG/DL
RBC # BLD: 4.8 E12/L (ref 3.8–5.8)
SODIUM BLD-SCNC: 146 MMOL/L (ref 132–146)
SPECIFIC GRAVITY UA: 1.02 (ref 1–1.03)
TOTAL PROTEIN: 6.5 G/DL (ref 6.4–8.3)
TRIGLYCERIDE, FASTING: 71 MG/DL (ref 0–149)
TSH SERPL DL<=0.05 MIU/L-ACNC: 1.86 UIU/ML (ref 0.27–4.2)
UROBILINOGEN, URINE: 0.2 E.U./DL
VLDLC SERPL CALC-MCNC: 14 MG/DL
WBC # BLD: 5.1 E9/L (ref 4.5–11.5)

## 2022-07-21 ENCOUNTER — OFFICE VISIT (OUTPATIENT)
Dept: FAMILY MEDICINE CLINIC | Age: 62
End: 2022-07-21
Payer: COMMERCIAL

## 2022-07-21 VITALS
BODY MASS INDEX: 20.89 KG/M2 | SYSTOLIC BLOOD PRESSURE: 120 MMHG | WEIGHT: 168 LBS | OXYGEN SATURATION: 98 % | TEMPERATURE: 98 F | DIASTOLIC BLOOD PRESSURE: 60 MMHG | HEIGHT: 75 IN | HEART RATE: 60 BPM

## 2022-07-21 DIAGNOSIS — Z12.5 SCREENING FOR MALIGNANT NEOPLASM OF PROSTATE: ICD-10-CM

## 2022-07-21 DIAGNOSIS — Z00.00 ENCOUNTER FOR WELL ADULT EXAM WITHOUT ABNORMAL FINDINGS: Primary | ICD-10-CM

## 2022-07-21 DIAGNOSIS — R55 NEUROCARDIOGENIC SYNCOPE: ICD-10-CM

## 2022-07-21 DIAGNOSIS — E78.2 MIXED HYPERLIPIDEMIA: ICD-10-CM

## 2022-07-21 DIAGNOSIS — E55.9 VITAMIN D DEFICIENCY: ICD-10-CM

## 2022-07-21 PROCEDURE — 99396 PREV VISIT EST AGE 40-64: CPT | Performed by: INTERNAL MEDICINE

## 2022-07-21 SDOH — ECONOMIC STABILITY: FOOD INSECURITY: WITHIN THE PAST 12 MONTHS, THE FOOD YOU BOUGHT JUST DIDN'T LAST AND YOU DIDN'T HAVE MONEY TO GET MORE.: NEVER TRUE

## 2022-07-21 SDOH — ECONOMIC STABILITY: TRANSPORTATION INSECURITY
IN THE PAST 12 MONTHS, HAS THE LACK OF TRANSPORTATION KEPT YOU FROM MEDICAL APPOINTMENTS OR FROM GETTING MEDICATIONS?: NO

## 2022-07-21 SDOH — ECONOMIC STABILITY: FOOD INSECURITY: WITHIN THE PAST 12 MONTHS, YOU WORRIED THAT YOUR FOOD WOULD RUN OUT BEFORE YOU GOT MONEY TO BUY MORE.: NEVER TRUE

## 2022-07-21 SDOH — ECONOMIC STABILITY: TRANSPORTATION INSECURITY
IN THE PAST 12 MONTHS, HAS LACK OF TRANSPORTATION KEPT YOU FROM MEETINGS, WORK, OR FROM GETTING THINGS NEEDED FOR DAILY LIVING?: NO

## 2022-07-21 ASSESSMENT — ENCOUNTER SYMPTOMS
EYE PAIN: 0
SHORTNESS OF BREATH: 0
RHINORRHEA: 0
SORE THROAT: 0
BLOOD IN STOOL: 0
CONSTIPATION: 0
CHEST TIGHTNESS: 0
VOMITING: 0
NAUSEA: 0
COUGH: 0
ABDOMINAL PAIN: 0
DIARRHEA: 0

## 2022-07-21 ASSESSMENT — PATIENT HEALTH QUESTIONNAIRE - PHQ9
SUM OF ALL RESPONSES TO PHQ9 QUESTIONS 1 & 2: 0
1. LITTLE INTEREST OR PLEASURE IN DOING THINGS: 0
SUM OF ALL RESPONSES TO PHQ QUESTIONS 1-9: 0
2. FEELING DOWN, DEPRESSED OR HOPELESS: 0

## 2022-07-21 ASSESSMENT — SOCIAL DETERMINANTS OF HEALTH (SDOH): HOW HARD IS IT FOR YOU TO PAY FOR THE VERY BASICS LIKE FOOD, HOUSING, MEDICAL CARE, AND HEATING?: NOT HARD AT ALL

## 2022-07-21 NOTE — PROGRESS NOTES
Well Adult Note  Name: Jaron Philip GFACMV Date: 2022   MRN: [id number] Sex: Male   Age: 59 y.o. Ethnicity: Non- / Non    : 1960 Race: White (non-)      Raz Gibbs. is here for well adult exam.  History:    - States low back pain. Improved. States lifting makes worse. States right sided lateral paraspinal muscles. States will be an ache and other times is sharp. Currently pain is 0/10. States still if sitting for prolonged times and is relieved if walks around. No numbness, tingling or weakness. States has seen chiropractor. States resolved currently 2022    - States syncope. No further passing out since last visit. has seen cardiology and EP. States had tilt table (). States was positive for neurocardiogenic syncope. No further passing out or blacking out. States no changes. States trying to stay hydrated. - Has mitral valve prolapse - follows with cardiology yearly - last visit 3/2018. Had echo 5471 - Showed diastolic dysfunction and left vent hypertrophy - no valve disease. Follow up yearly. - States has some chronic aches to joints. States seem to move and hurts somewhere different locations on different days. Not taking anything for pain. - States has high cholesterol. Trying to watch diet. Not currently on medications. The 10-year ASCVD risk score (Lucina Nelson, et al., 2013) is: 7.8% (2022)      - States having decreased urine flow, states increased frequency. No nocturia. Unchanged 2022    - States cerumen impaction.  States had ENT in past.       - labs from 7/15/2022 reviewed with patient 2022    Health Maintenance  - immunizations:  Influenza Vaccination  - (2013), (2015), (2016), (2017), (2018), (2019), (), () - [facility]  Pneumonia Vaccination  Zoster/Shingles Vaccine - (10/2020) #1, (2020) #2 - shingrix  Tetanus Vaccination (2018) - tdap [facility]  covid (3/12/2021) #1, (2021) #2, (2022) #3 - moderna     - Vaccination  - (2013), (2015), (2016), (2017), (2018), (2019), (2020)  Pneumonia Vaccination  Zoster/Shingles Vaccine - (10/2020) #1, (12/5/2020) #2 - shingrix  Tetanus Vaccination (2018) - tdap [facility]  covid (3/12/2021) #1, (4/9/2021) #2, (11/27/2022) #3 - moderna     - Screenings:  Colonoscopy - (4/2016) - tortuous - repeat 8-10yrs  Prostate     Neurocardiogenic syncope  - neurocardiogenic syncope per tilt (2015)  - avoid dehydration  - aspirin     Mixed hyperlipidemia  - watch diet and lifestyle - exercise  - The 10-year ASCVD risk score (Rashel Muro, et al., 2013) is: 7.8% (7/2022)   - not on medications at present      Mitral valve prolapse  - mitral valve prolapse  - not currently follows with cardiology   - last known echo 2017     Vitamin D deficiency  - on otc vitamin D    Personalized Preventive Plan   Current Health Maintenance Status  Immunization History   Administered Date(s) Administered    COVID-19, MODERNA BLUE border, Primary or Immunocompromised, (age 12y+), IM, 100 mcg/0.5mL 03/12/2021, 04/09/2021, 11/27/2021    Influenza Virus Vaccine 10/21/2016, 10/06/2017, 10/11/2019, 09/12/2020, 10/09/2021    Influenza, Oliverio Staple, IM, PF (6 mo and older Fluzone, Flulaval, Fluarix, and 3 yrs and older Afluria) 10/12/2018, 10/11/2019, 10/09/2021    Tdap (Boostrix, Adacel) 07/13/2018    Zoster Recombinant (Shingrix) 10/03/2020, 12/05/2020        Health Maintenance   Topic Date Due    HIV screen  Never done    Hepatitis C screen  Never done    COVID-19 Vaccine (4 - Booster for Moderna series) 03/27/2022    Depression Screen  05/18/2022    Flu vaccine (1) 09/01/2022    Prostate Specific Antigen (PSA) Screening or Monitoring  07/15/2023    Colorectal Cancer Screen  04/20/2026    Lipids  07/15/2027    DTaP/Tdap/Td vaccine (2 - Td or Tdap) 07/13/2028    Shingles vaccine  Completed    Hepatitis A vaccine  Aged Out    Hepatitis B vaccine  Aged Out    Hib vaccine  Aged Out    Meningococcal (ACWY) vaccine  Aged Out Pneumococcal 0-64 years Vaccine  Aged Out     Recommendations for Preventive Services Due: see orders and patient instructions/AVS.    Return in 1 year (on 7/21/2023).     Orders Placed This Encounter   Procedures    Comprehensive Metabolic Panel     Standing Status:   Future     Standing Expiration Date:   7/21/2023    Magnesium     Standing Status:   Future     Standing Expiration Date:   7/21/2023    Lipid, Fasting     Standing Status:   Future     Standing Expiration Date:   7/21/2023    Vitamin D 25 Hydroxy     Standing Status:   Future     Standing Expiration Date:   7/21/2023    TSH     Standing Status:   Future     Standing Expiration Date:   7/21/2023    Urinalysis     Standing Status:   Future     Standing Expiration Date:   7/21/2023    PSA Screening     Standing Status:   Future     Standing Expiration Date:   7/21/2023    CBC with Auto Differential     Standing Status:   Future     Standing Expiration Date:   7/21/2023     Requested Prescriptions      No prescriptions requested or ordered in this encounter     Electronically signed by Phil Nettles MD on 7/21/2022 at 9:37 AM

## 2022-07-21 NOTE — PATIENT INSTRUCTIONS
Well Visit, Men 48 to 72: Care Instructions  Overview     Well visits can help you stay healthy. Your doctor has checked your overall health and may have suggested ways to take good care of yourself. Your doctor also may have recommended tests. At home, you can help prevent illness withhealthy eating, regular exercise, and other steps. Follow-up care is a key part of your treatment and safety. Be sure to make and go to all appointments, and call your doctor if you are having problems. It's also a good idea to know your test results and keep alist of the medicines you take. How can you care for yourself at home? Get screening tests that you and your doctor decide on. Screening helps find diseases before any symptoms appear. Eat healthy foods. Choose fruits, vegetables, whole grains, protein, and low-fat dairy foods. Limit fat, especially saturated fat. Reduce salt in your diet. Limit alcohol. Have no more than 2 drinks a day or 14 drinks a week. Get at least 30 minutes of exercise on most days of the week. Walking is a good choice. You also may want to do other activities, such as running, swimming, cycling, or playing tennis or team sports. Reach and stay at a healthy weight. This will lower your risk for many problems, such as obesity, diabetes, heart disease, and high blood pressure. Do not smoke. Smoking can make health problems worse. If you need help quitting, talk to your doctor about stop-smoking programs and medicines. These can increase your chances of quitting for good. Care for your mental health. It is easy to get weighed down by worry and stress. Learn strategies to manage stress, like deep breathing and mindfulness, and stay connected with your family and community. If you find you often feel sad or hopeless, talk with your doctor. Treatment can help. Talk to your doctor about whether you have any risk factors for sexually transmitted infections (STIs).  You can help prevent STIs if you wait to have sex with a new partner (or partners) until you've each been tested for STIs. It also helps if you use condoms (male or female condoms) and if you limit your sex partners to one person who only has sex with you. Vaccines are available for some STIs. If it's important to you to prevent pregnancy with your partner, talk with your doctor about birth control options that might be best for you. If you think you may have a problem with alcohol or drug use, talk to your doctor. This includes prescription medicines (such as amphetamines and opioids) and illegal drugs (such as cocaine and methamphetamine). Your doctor can help you figure out what type of treatment is best for you. Protect your skin from too much sun. When you're outdoors from 10 a.m. to 4 p.m., stay in the shade or cover up with clothing and a hat with a wide brim. Wear sunglasses that block UV rays. Even when it's cloudy, put broad-spectrum sunscreen (SPF 30 or higher) on any exposed skin. See a dentist one or two times a year for checkups and to have your teeth cleaned. Wear a seat belt in the car. When should you call for help? Watch closely for changes in your health, and be sure to contact your doctor if you have any problems or symptoms that concern you. Where can you learn more? Go to https://ZAPRbritton.healthSmartFocuspartners. org and sign in to your [employer] account. Enter R134 in the [facility] box to learn more about \"Well Visit, Men 48 to 72: Care Instructions. \"     If you do not have an account, please click on the \"Sign Up Now\" link. Current as of: October 6, 2021               Content Version: 13.3  © 5632-2858 Healthwise, Incorporated. Care instructions adapted under license by [facility] ([facility]). If you have questions about a medical condition or this instruction, always ask your healthcare professional. [place] [address] any warranty or liability for your use of this information.            A Healthy Lifestyle: Care Instructions  Your Care Instructions     A healthy lifestyle can help you feel good, stay at a healthy weight, and have plenty of energy for both work and play. A healthy lifestyle is something youcan share with your whole family. A healthy lifestyle also can lower your risk for serious health problems, suchas high blood pressure, heart disease, and diabetes. You can follow a few steps listed below to improve your health and the healthof your family. Follow-up care is a key part of your treatment and safety. Be sure to make and go to all appointments, and call your doctor if you are having problems. It's also a good idea to know your test results and keep alist of the medicines you take. How can you care for yourself at home? Do not eat too much sugar, fat, or fast foods. You can still have dessert and treats now and then. The goal is moderation. Start small to improve your eating habits. Pay attention to portion sizes, drink less juice and soda pop, and eat more fruits and vegetables. Eat a healthy amount of food. A 3-ounce serving of meat, for example, is about the size of a deck of cards. Fill the rest of your plate with vegetables and whole grains. Limit the amount of soda and sports drinks you have every day. Drink more water when you are thirsty. Eat plenty of fruits and vegetables every day. Have an apple or some carrot sticks as an afternoon snack instead of a candy bar. Try to have fruits and/or vegetables at every meal.  Make exercise part of your daily routine. You may want to start with simple activities, such as walking, bicycling, or slow swimming. Try to be active 30 to 60 minutes every day. You do not need to do all 30 to 60 minutes all at once. For example, you can exercise 3 times a day for 10 or 20 minutes. Moderate exercise is safe for most people, but it is always a good idea to talk to your doctor before starting an exercise program.  Keep moving.  Chantelle Rajput the lawn, work in the garden, or clean your house. Take the stairs instead of the elevator at work. If you smoke, quit. People who smoke have an increased risk for heart attack, stroke, cancer, and other lung illnesses. Quitting is hard, but there are ways to boost your chance of quitting tobacco for good. Use nicotine gum, patches, or lozenges. Ask your doctor about stop-smoking programs and medicines. Keep trying. In addition to reducing your risk of diseases in the future, you will notice some benefits soon after you stop using tobacco. If you have shortness of breath or asthma symptoms, they will likely get better within a few weeks after you quit. Limit how much alcohol you drink. Moderate amounts of alcohol (up to 2 drinks a day for men, 1 drink a day for women) are okay. But drinking too much can lead to liver problems, high blood pressure, and other health problems. Family health  If you have a family, there are many things you can do together to improve yourhealth. Eat meals together as a family as often as possible. Eat healthy foods. This includes fruits, vegetables, lean meats and dairy, and whole grains. Include your family in your fitness plan. Most people think of activities such as jogging or tennis as the way to fitness, but there are many ways you and your family can be more active. Anything that makes you breathe hard and gets your heart pumping is exercise. Here are some tips:  Walk to do errands or to take your child to school or the bus. Go for a family bike ride after dinner instead of watching TV. Where can you learn more? Go to https://[a]list gamesbritton.Mitokyne. org and sign in to your [employer] account. Enter T201 in the [facility] box to learn more about \"A Healthy Lifestyle: Care Instructions. \"     If you do not have an account, please click on the \"Sign Up Now\" link. Current as of: February 9, 2022               Content Version: 13.3  © 2706-4102 Healthwise, [employer]. Care instructions adapted under license by [facility] ([facility]). If you have questions about a medical condition or this instruction, always ask your healthcare professional. [address] any warranty or liability for your use of this information. Heart-Healthy Diet   Sodium, Fat, and Cholesterol Controlled Diet       What Is a Heart Healthy Diet? A heart-healthy diet is one that limits sodium , certain types of fat , and cholesterol . This type of diet is recommended for:   People with any form of cardiovascular disease (eg, coronary heart disease , peripheral vascular disease , previous heart attack , previous stroke )   People with risk factors for cardiovascular disease, such as high blood pressure , high cholesterol , or diabetes   Anyone who wants to lower their risk of developing cardiovascular disease   Sodium    Sodium is a mineral found in many foods. In general, most people consume much more sodium than they need. Diets high in sodium can increase blood pressure and lead to edema (water retention). On a heart-healthy diet, you should consume no more than 2,300 mg (milligrams) of sodium per dayabout the amount in one teaspoon of table salt. The foods highest in sodium include table salt (about 50% sodium), processed foods, convenience foods, and preserved foods. Cholesterol    Cholesterol is a fat-like, waxy substance in your blood. Our bodies make some cholesterol. It is also found in animal products, with the highest amounts in fatty meat, egg yolks, whole milk, cheese, shellfish, and organ meats. On a heart-healthy diet, you should limit your cholesterol intake to less than 200 mg per day. It is normal and important to have some cholesterol in your bloodstream. But too much cholesterol can cause plaque to build up within your arteries, which can eventually lead to a heart attack or stroke.    The two types of cholesterol that are most commonly referred to are:   Low-density lipoprotein (LDL) cholesterol  Also known as bad cholesterol, this is the cholesterol that tends to build up along your arteries. Bad cholesterol levels are increased by eating fats that are saturated or hydrogenated. Optimal level of this cholesterol is less than 100. Over 130 starts to get risky for heart disease. High-density lipoprotein (HDL) cholesterol  Also known as good cholesterol, this type of cholesterol actually carries cholesterol away from your arteries and may, therefore, help lower your risk of having a heart attack. You want this level to be high (ideally greater than 60). It is a risk to have a level less than 40. You can raise this good cholesterol by eating olive oil, canola oil, avocados, or nuts. Exercise raises this level, too. Fat    Fat is calorie dense and packs a lot of calories into a small amount of food. Even though fats should be limited due to their high calorie content, not all fats are bad. In fact, some fats are quite healthful. Fat can be broken down into four main types.    The good-for-you fats are:   Monounsaturated fat  found in oils such as olive and canola, avocados, and nuts and natural nut butters; can decrease cholesterol levels, while keeping levels of HDL cholesterol high   Polyunsaturated fat  found in oils such as safflower, sunflower, soybean, corn, and sesame; can decrease total cholesterol and LDL cholesterol   Omega-3 fatty acids  particularly those found in fatty fish (such as salmon, trout, tuna, mackerel, herring, and sardines); can decrease risk of arrhythmias, decrease triglyceride levels, and slightly lower blood pressure   The fats that you want to limit are:   Saturated fat  found in animal products, many fast foods, and a few vegetables; increases total blood cholesterol, including LDL levels   Animal fats that are saturated include: butter, lard, whole-milk dairy products, meat fat, and poultry skin   Vegetable fats that are saturated include: hydrogenated shortening, palm oil, coconut oil, cocoa butter   Hydrogenated or trans fat  found in margarine and vegetable shortening, most shelf stable snack foods, and fried foods; increases LDL and decreases HDL     It is generally recommended that you limit your total fat for the day to less than 30% of your total calories. If you follow an 1800-calorie heart healthy diet, for example, this would mean 60 grams of fat or less per day. Saturated fat and trans fat in your diet raises your blood cholesterol the most, much more than dietary cholesterol does. For this reason, on a heart-healthy diet, less than 7% of your calories should come from saturated fat and ideally 0% from trans fat. On an 1800-calorie diet, this translates into less than 14 grams of saturated fat per day, leaving 46 grams of fat to come from mono- and polyunsaturated fats.    Food Choices on a Heart Healthy Diet   Food Category   Foods Recommended   Foods to Avoid   Grains   Breads and rolls without salted tops Most dry and cooked cereals Unsalted crackers and breadsticks Low-sodium or homemade breadcrumbs or stuffing All rice and pastas   Breads, rolls, and crackers with salted tops High-fat baked goods (eg, muffins, donuts, pastries) Quick breads, self-rising flour, and biscuit mixes Regular bread crumbs Instant hot cereals Commercially prepared rice, pasta, or stuffing mixes   Vegetables   Most fresh, frozen, and low-sodium canned vegetables Low-sodium and salt-free vegetable juices Canned vegetables if unsalted or rinsed   Regular canned vegetables and juices, including sauerkraut and pickled vegetables Frozen vegetables with sauces Commercially prepared potato and vegetable mixes   Fruits   Most fresh, frozen, and canned fruits All fruit juices   Fruits processed with salt or sodium   Milk   Nonfat or low-fat (1%) milk Nonfat or low-fat yogurt Cottage cheese, low-fat ricotta, cheeses labeled as low-fat and low-sodium   Whole milk Reduced-fat (2%) milk Malted and chocolate milk Full fat yogurt Most cheeses (unless low-fat and low salt) Buttermilk (no more than 1 cup per week)   Meats and Beans   Lean cuts of fresh or frozen beef, veal, lamb, or pork (look for the word loin) Fresh or frozen poultry without the skin Fresh or frozen fish and some shellfish Egg whites and egg substitutes (Limit whole eggs to three per week) Tofu Nuts or seeds (unsalted, dry-roasted), low-sodium peanut butter Dried peas, beans, and lentils   Any smoked, cured, salted, or canned meat, fish, or poultry (including agustin, chipped beef, cold cuts, hot dogs, sausages, sardines, and anchovies) Poultry skins Breaded and/or fried fish or meats Canned peas, beans, and lentils Salted nuts   Fats and Oils   Olive oil and canola oil Low-sodium, low-fat salad dressings and mayonnaise   Butter, margarine, coconut and palm oils, agustin fat   Snacks, Sweets, and Condiments   Low-sodium or unsalted versions of broths, soups, soy sauce, and condiments Pepper, herbs, and spices; vinegar, lemon, or lime juice Low-fat frozen desserts (yogurt, sherbet, fruit bars) Sugar, cocoa powder, honey, syrup, jam, and preserves Low-fat, trans-fat free cookies, cakes, and pies Boubacar and animal crackers, fig bars, flex snaps   High-fat desserts Broth, soups, gravies, and sauces, made from instant mixes or other high-sodium ingredients Salted snack foods Canned olives Meat tenderizers, seasoning salt, and most flavored vinegars   Beverages   Low-sodium carbonated beverages Tea and coffee in moderation Soy milk   Commercially softened water   Suggestions   Make whole grains, fruits, and vegetables the base of your diet. Choose heart-healthy fats such as canola, olive, and flaxseed oil, and foods high in heart-healthy fats, such as nuts, seeds, soybeans, tofu, and fish.     Eat fish at least twice per week; the fish highest in omega-3 fatty acids and lowest in mercury include salmon, herring, mackerel, sardines, and canned chunk light tuna. If you eat fish less than twice per week or have high triglycerides, talk to your doctor about taking fish oil supplements. Read food labels. For products low in fat and cholesterol, look for fat free, low-fat, cholesterol free, saturated fat free, and trans fat freeAlso scan the Nutrition Facts Label, which lists saturated fat, trans fat, and cholesterol amounts. For products low in sodium, look for sodium free, very low sodium, low sodium, no added salt, and unsalted   Skip the salt when cooking or at the table; if food needs more flavor, get creative and try out different herbs and spices. Garlic and onion also add substantial flavor to foods. Trim any visible fat off meat and poultry before cooking, and drain the fat off after tello. Use cooking methods that require little or no added fat, such as grilling, boiling, baking, poaching, broiling, roasting, steaming, stir-frying, and sauting. Avoid fast food and convenience food. They tend to be high in saturated and trans fat and have a lot of added salt. Talk to a registered dietitian for individualized diet advice. Last Reviewed: March 2011 Zenaida Arboleda MS, MPH, RD   Updated: 3/29/2011     Keep Your Memory Dion Leticia       Many factors can affect your ability to remembera hectic lifestyle, aging, stress, chronic disease, and certain medicines. But, there are steps you can take to sharpen your mind and help preserve your memory. Challenge Your Brain   Regularly challenging your mind may help keeps it in top shape. Good mental exercises include:   Crossword puzzlesUse a dictionary if you need it; you will learn more that way. Brainteasers Try some! Crafts, such as wood working and sewing   Hobbies, such as gardening and building model airplanes   SocializingVisit old friends or join groups to meet new ones.    Reading   Learning a new language   Taking a class, whether it be art history or adrian chi   TravelingExperience the food, history, and culture of your destination   Learning to use a computer   Going to museums, the theater, or thought-provoking movies   Changing things in your daily life, such as reversing your pattern in the grocery store or brushing your teeth using your nondominant hand   Use Memory Aids   There is no need to remember every detail on your own. These memory aids can help:   Calendars and day planners   Electronic organizers to store all sorts of helpful informationThese devices can \"beep\" to remind you of appointments. A book of days to record birthdays, anniversaries, and other occasions that occur on the same date every year   Detailed \"to-do\" lists and strategically placed sticky notes   Quick \"study\" sessionsBefore a gathering, review who will be there so their names will be fresh in your mind. Establish routinesFor example, keep your keys, wallet, and umbrella in the same place all the time or take medicine with your 8:00 AM glass of juice   Live a Healthy Life   Many actions that will keep your body strong will do the same for your mind. For example:   Talk to Your Doctor About Herbs and Supplements    Malnutrition and vitamin deficiencies can impair your mental function. For example, vitamin B12 deficiency can cause a range of symptoms, including confusion. But, what if your nutritional needs are being met? Can herbs and supplements still offer a benefit? Researchers have investigated a range of natural remedies, such as ginkgo , ginseng , and the supplement phosphatidylserine (PS). So far, though, the evidence is inconsistent as to whether these products can improve memory or thinking. If you are interested in taking herbs and supplements, talk to your doctor first because they may interact with other medicines that you are taking.    Exercise Regularly    Among the many benefits of regular exercise are increased blood flow to the brain and decreased risk of certain diseases that can interfere with memory function. One study found that even moderate exercise has a beneficial effect. Examples of \"moderate\" exercise include:   Playing 18 holes of golf once a week, without a cart   Playing tennis twice a week   Walking one mile per day   Manage Stress    It can be tough to remember what is important when your mind is cluttered. Make time for relaxation. Choose activities that calm you down, and make it routine. Manage Chronic Conditions    Side effects of high blood pressure , diabetes, and heart disease can interfere with mental function. Many of the lifestyle steps discussed here can help manage these conditions. Strive to eat a healthy diet, exercise regularly, get stress under control, and follow your doctor's advice for your condition. Minimize Medications    Talk to your doctor about the medicines that you take. Some may be unnecessary. Also, healthy lifestyle habits may lower the need for certain drugs.      Last Reviewed: April 2010 Magdaleno Chino MD   Updated: 4/13/2010

## 2023-07-07 ENCOUNTER — TELEPHONE (OUTPATIENT)
Dept: PRIMARY CARE CLINIC | Age: 63
End: 2023-07-07

## 2023-07-07 DIAGNOSIS — E78.2 MIXED HYPERLIPIDEMIA: ICD-10-CM

## 2023-07-07 DIAGNOSIS — Z00.00 ENCOUNTER FOR WELL ADULT EXAM WITHOUT ABNORMAL FINDINGS: ICD-10-CM

## 2023-07-07 DIAGNOSIS — E55.9 VITAMIN D DEFICIENCY: ICD-10-CM

## 2023-07-07 DIAGNOSIS — Z12.5 SCREENING FOR MALIGNANT NEOPLASM OF PROSTATE: ICD-10-CM

## 2023-07-07 LAB
ALBUMIN SERPL-MCNC: 4.5 G/DL (ref 3.5–5.2)
ALP SERPL-CCNC: 65 U/L (ref 40–129)
ALT SERPL-CCNC: 15 U/L (ref 0–40)
ANION GAP SERPL CALCULATED.3IONS-SCNC: 12 MMOL/L (ref 7–16)
AST SERPL-CCNC: 16 U/L (ref 0–39)
BASOPHILS # BLD: 0.03 E9/L (ref 0–0.2)
BASOPHILS NFR BLD: 0.5 % (ref 0–2)
BILIRUB SERPL-MCNC: 0.6 MG/DL (ref 0–1.2)
BILIRUB UR QL STRIP: NEGATIVE
BUN SERPL-MCNC: 13 MG/DL (ref 6–23)
CALCIUM SERPL-MCNC: 9.6 MG/DL (ref 8.6–10.2)
CHLORIDE SERPL-SCNC: 105 MMOL/L (ref 98–107)
CHOLESTEROL, FASTING: 245 MG/DL (ref 0–199)
CLARITY UR: CLEAR
CO2 SERPL-SCNC: 23 MMOL/L (ref 22–29)
COLOR UR: YELLOW
CREAT SERPL-MCNC: 0.9 MG/DL (ref 0.7–1.2)
EOSINOPHIL # BLD: 0.09 E9/L (ref 0.05–0.5)
EOSINOPHIL NFR BLD: 1.6 % (ref 0–6)
ERYTHROCYTE [DISTWIDTH] IN BLOOD BY AUTOMATED COUNT: 13.4 FL (ref 11.5–15)
GLUCOSE SERPL-MCNC: 90 MG/DL (ref 74–99)
GLUCOSE UR STRIP-MCNC: NEGATIVE MG/DL
HCT VFR BLD AUTO: 45.3 % (ref 37–54)
HDLC SERPL-MCNC: 75 MG/DL
HGB BLD-MCNC: 14.8 G/DL (ref 12.5–16.5)
HGB UR QL STRIP: NEGATIVE
IMM GRANULOCYTES # BLD: 0.01 E9/L
IMM GRANULOCYTES NFR BLD: 0.2 % (ref 0–5)
KETONES UR STRIP-MCNC: NEGATIVE MG/DL
LDL CHOLESTEROL CALCULATED: 140 MG/DL (ref 0–99)
LEUKOCYTE ESTERASE UR QL STRIP: NEGATIVE
LYMPHOCYTES # BLD: 1.52 E9/L (ref 1.5–4)
LYMPHOCYTES NFR BLD: 27.8 % (ref 20–42)
MAGNESIUM SERPL-MCNC: 2.4 MG/DL (ref 1.6–2.6)
MCH RBC QN AUTO: 29.1 PG (ref 26–35)
MCHC RBC AUTO-ENTMCNC: 32.7 % (ref 32–34.5)
MCV RBC AUTO: 89.2 FL (ref 80–99.9)
MONOCYTES # BLD: 0.48 E9/L (ref 0.1–0.95)
MONOCYTES NFR BLD: 8.8 % (ref 2–12)
NEUTROPHILS # BLD: 3.34 E9/L (ref 1.8–7.3)
NEUTS SEG NFR BLD: 61.1 % (ref 43–80)
NITRITE UR QL STRIP: NEGATIVE
PH UR STRIP: 6 [PH] (ref 5–9)
PLATELET # BLD AUTO: 188 E9/L (ref 130–450)
PMV BLD AUTO: 10.9 FL (ref 7–12)
POTASSIUM SERPL-SCNC: 3.9 MMOL/L (ref 3.5–5)
PROT SERPL-MCNC: 6.7 G/DL (ref 6.4–8.3)
PROT UR STRIP-MCNC: NEGATIVE MG/DL
PSA SERPL-MCNC: 0.92 NG/ML (ref 0–4)
RBC # BLD AUTO: 5.08 E12/L (ref 3.8–5.8)
SODIUM SERPL-SCNC: 140 MMOL/L (ref 132–146)
SP GR UR STRIP: 1.01 (ref 1–1.03)
TRIGLYCERIDE, FASTING: 152 MG/DL (ref 0–149)
TSH SERPL-MCNC: 1.85 UIU/ML (ref 0.27–4.2)
UROBILINOGEN UR STRIP-ACNC: 0.2 E.U./DL
VITAMIN D 25-HYDROXY: 41 NG/ML (ref 30–100)
VLDLC SERPL CALC-MCNC: 30 MG/DL
WBC # BLD: 5.5 E9/L (ref 4.5–11.5)

## 2023-07-19 ENCOUNTER — OFFICE VISIT (OUTPATIENT)
Dept: PRIMARY CARE CLINIC | Age: 63
End: 2023-07-19
Payer: COMMERCIAL

## 2023-07-19 VITALS
SYSTOLIC BLOOD PRESSURE: 132 MMHG | DIASTOLIC BLOOD PRESSURE: 82 MMHG | BODY MASS INDEX: 21.76 KG/M2 | TEMPERATURE: 98.2 F | HEIGHT: 75 IN | HEART RATE: 74 BPM | WEIGHT: 175 LBS | OXYGEN SATURATION: 99 %

## 2023-07-19 DIAGNOSIS — Z00.00 ENCOUNTER FOR WELL ADULT EXAM WITHOUT ABNORMAL FINDINGS: Primary | ICD-10-CM

## 2023-07-19 DIAGNOSIS — E78.2 MIXED HYPERLIPIDEMIA: ICD-10-CM

## 2023-07-19 DIAGNOSIS — I34.1 MITRAL VALVE PROLAPSE: ICD-10-CM

## 2023-07-19 DIAGNOSIS — E55.9 VITAMIN D DEFICIENCY: ICD-10-CM

## 2023-07-19 DIAGNOSIS — R55 NEUROCARDIOGENIC SYNCOPE: ICD-10-CM

## 2023-07-19 PROCEDURE — 99396 PREV VISIT EST AGE 40-64: CPT | Performed by: INTERNAL MEDICINE

## 2023-07-19 RX ORDER — PRAVASTATIN SODIUM 20 MG
20 TABLET ORAL DAILY
Qty: 90 TABLET | Refills: 1 | Status: SHIPPED | OUTPATIENT
Start: 2023-07-19

## 2023-07-19 ASSESSMENT — ENCOUNTER SYMPTOMS
SHORTNESS OF BREATH: 0
VOMITING: 0
CONSTIPATION: 0
EYE PAIN: 0
CHEST TIGHTNESS: 0
SORE THROAT: 0
COUGH: 0
ABDOMINAL PAIN: 0
BLOOD IN STOOL: 0
RHINORRHEA: 0
NAUSEA: 0
DIARRHEA: 0

## 2023-07-19 ASSESSMENT — PATIENT HEALTH QUESTIONNAIRE - PHQ9
SUM OF ALL RESPONSES TO PHQ9 QUESTIONS 1 & 2: 0
SUM OF ALL RESPONSES TO PHQ QUESTIONS 1-9: 0
1. LITTLE INTEREST OR PLEASURE IN DOING THINGS: 0
SUM OF ALL RESPONSES TO PHQ QUESTIONS 1-9: 0
2. FEELING DOWN, DEPRESSED OR HOPELESS: 0

## 2023-07-19 NOTE — PATIENT INSTRUCTIONS
day.   Saturated fat and trans fat in your diet raises your blood cholesterol the most, much more than dietary cholesterol does. For this reason, on a heart-healthy diet, less than 7% of your calories should come from saturated fat and ideally 0% from trans fat. On an 1800-calorie diet, this translates into less than 14 grams of saturated fat per day, leaving 46 grams of fat to come from mono- and polyunsaturated fats.    Food Choices on a Heart Healthy Diet   Food Category   Foods Recommended   Foods to Avoid   Grains   Breads and rolls without salted tops Most dry and cooked cereals Unsalted crackers and breadsticks Low-sodium or homemade breadcrumbs or stuffing All rice and pastas   Breads, rolls, and crackers with salted tops High-fat baked goods (eg, muffins, donuts, pastries) Quick breads, self-rising flour, and biscuit mixes Regular bread crumbs Instant hot cereals Commercially prepared rice, pasta, or stuffing mixes   Vegetables   Most fresh, frozen, and low-sodium canned vegetables Low-sodium and salt-free vegetable juices Canned vegetables if unsalted or rinsed   Regular canned vegetables and juices, including sauerkraut and pickled vegetables Frozen vegetables with sauces Commercially prepared potato and vegetable mixes   Fruits   Most fresh, frozen, and canned fruits All fruit juices   Fruits processed with salt or sodium   Milk   Nonfat or low-fat (1%) milk Nonfat or low-fat yogurt Cottage cheese, low-fat ricotta, cheeses labeled as low-fat and low-sodium   Whole milk Reduced-fat (2%) milk Malted and chocolate milk Full fat yogurt Most cheeses (unless low-fat and low salt) Buttermilk (no more than 1 cup per week)   Meats and Beans   Lean cuts of fresh or frozen beef, veal, lamb, or pork (look for the word loin) Fresh or frozen poultry without the skin Fresh or frozen fish and some shellfish Egg whites and egg substitutes (Limit whole eggs to three per week) Tofu Nuts or seeds (unsalted, dry-roasted),

## 2023-08-10 ENCOUNTER — TELEPHONE (OUTPATIENT)
Dept: FAMILY MEDICINE CLINIC | Age: 63
End: 2023-08-10

## 2023-08-10 DIAGNOSIS — R55 NEUROCARDIOGENIC SYNCOPE: Primary | ICD-10-CM

## 2023-08-10 DIAGNOSIS — I34.1 MVP (MITRAL VALVE PROLAPSE): ICD-10-CM

## 2023-08-10 NOTE — TELEPHONE ENCOUNTER
----- Message from [facility] sent at 8/10/2023 12:09 PM EDT -----  Subject: Referral Request    Reason for referral request? Cardiology  Provider patient wants to be referred to(if known):     Provider Phone Number(if known):[phone]    Additional Information for Provider?  Please call Max when the referral has   been sent and with any questions.   ---------------------------------------------------------------------------  --------------  Adelina Wellington Sharif    [phone]; OK to leave message on voicemail  ---------------------------------------------------------------------------  --------------

## 2023-08-10 NOTE — TELEPHONE ENCOUNTER
Patient advised that referral was placed for Dr Dorita Corbin and that the referral dept will be working on this. I asked him to call back to the referral dept on Monday if he had not heard from anyone.

## 2024-01-11 DIAGNOSIS — E78.2 MIXED HYPERLIPIDEMIA: ICD-10-CM

## 2024-01-11 LAB
ALBUMIN SERPL-MCNC: 4.2 G/DL (ref 3.5–5.2)
ALP BLD-CCNC: 65 U/L (ref 40–129)
ALT SERPL-CCNC: 31 U/L (ref 0–40)
ANION GAP SERPL CALCULATED.3IONS-SCNC: 11 MMOL/L (ref 7–16)
AST SERPL-CCNC: 24 U/L (ref 0–39)
BILIRUB SERPL-MCNC: 0.6 MG/DL (ref 0–1.2)
BUN BLDV-MCNC: 13 MG/DL (ref 6–23)
CALCIUM SERPL-MCNC: 9.4 MG/DL (ref 8.6–10.2)
CHLORIDE BLD-SCNC: 104 MMOL/L (ref 98–107)
CHOLESTEROL, FASTING: 256 MG/DL
CO2: 26 MMOL/L (ref 22–29)
CREAT SERPL-MCNC: 1 MG/DL (ref 0.7–1.2)
GFR SERPL CREATININE-BSD FRML MDRD: >60 ML/MIN/1.73M2
GLUCOSE BLD-MCNC: 93 MG/DL (ref 74–99)
HDLC SERPL-MCNC: 75 MG/DL
LDL CHOLESTEROL: 164 MG/DL
POTASSIUM SERPL-SCNC: 3.8 MMOL/L (ref 3.5–5)
SODIUM BLD-SCNC: 141 MMOL/L (ref 132–146)
TOTAL CK: 106 U/L (ref 20–200)
TOTAL PROTEIN: 6.4 G/DL (ref 6.4–8.3)
TRIGLYCERIDE, FASTING: 85 MG/DL
VLDLC SERPL CALC-MCNC: 17 MG/DL

## 2024-01-12 ENCOUNTER — TELEPHONE (OUTPATIENT)
Dept: PRIMARY CARE CLINIC | Age: 64
End: 2024-01-12

## 2024-01-12 NOTE — TELEPHONE ENCOUNTER
Please let the patient know that blood work results showed    Creatinine kinase is a measure of muscle inflammation was in normal range suggesting no active muscle inflammation or damage    Cholesterol levels were again elevated and slightly more elevated when compared to previous.  HDL or good cholesterol is elevated in the beneficial range.  Triglyceride levels however were improved.  Uncertain if patient is taking pravastatin.  If taking pravastatin we may need to consider alternative choice or increase dose or additional therapy as cholesterols have not improved other than triglycerides.     Electrolytes, liver functions, kidney function values were normal    Thanks

## 2024-01-15 NOTE — TELEPHONE ENCOUNTER
Patient notified and verbalized understanding.  Patient states he is taking the Pravastatin every day.  He will talk to you about other options at his upcoming visit.

## 2024-01-22 SDOH — ECONOMIC STABILITY: INCOME INSECURITY: HOW HARD IS IT FOR YOU TO PAY FOR THE VERY BASICS LIKE FOOD, HOUSING, MEDICAL CARE, AND HEATING?: PATIENT DECLINED

## 2024-01-22 SDOH — ECONOMIC STABILITY: HOUSING INSECURITY
IN THE LAST 12 MONTHS, WAS THERE A TIME WHEN YOU DID NOT HAVE A STEADY PLACE TO SLEEP OR SLEPT IN A SHELTER (INCLUDING NOW)?: PATIENT DECLINED

## 2024-01-22 SDOH — ECONOMIC STABILITY: FOOD INSECURITY: WITHIN THE PAST 12 MONTHS, YOU WORRIED THAT YOUR FOOD WOULD RUN OUT BEFORE YOU GOT MONEY TO BUY MORE.: PATIENT DECLINED

## 2024-01-22 SDOH — ECONOMIC STABILITY: TRANSPORTATION INSECURITY
IN THE PAST 12 MONTHS, HAS LACK OF TRANSPORTATION KEPT YOU FROM MEETINGS, WORK, OR FROM GETTING THINGS NEEDED FOR DAILY LIVING?: PATIENT DECLINED

## 2024-01-22 SDOH — ECONOMIC STABILITY: FOOD INSECURITY: WITHIN THE PAST 12 MONTHS, THE FOOD YOU BOUGHT JUST DIDN'T LAST AND YOU DIDN'T HAVE MONEY TO GET MORE.: PATIENT DECLINED

## 2024-01-23 ENCOUNTER — OFFICE VISIT (OUTPATIENT)
Dept: FAMILY MEDICINE CLINIC | Age: 64
End: 2024-01-23
Payer: COMMERCIAL

## 2024-01-23 VITALS
RESPIRATION RATE: 18 BRPM | HEART RATE: 81 BPM | DIASTOLIC BLOOD PRESSURE: 80 MMHG | BODY MASS INDEX: 23.5 KG/M2 | SYSTOLIC BLOOD PRESSURE: 130 MMHG | TEMPERATURE: 98.6 F | OXYGEN SATURATION: 94 % | HEIGHT: 75 IN | WEIGHT: 189 LBS

## 2024-01-23 DIAGNOSIS — Z12.5 SCREENING FOR MALIGNANT NEOPLASM OF PROSTATE: Primary | ICD-10-CM

## 2024-01-23 DIAGNOSIS — I34.1 MVP (MITRAL VALVE PROLAPSE): ICD-10-CM

## 2024-01-23 DIAGNOSIS — E55.9 VITAMIN D DEFICIENCY: ICD-10-CM

## 2024-01-23 DIAGNOSIS — E78.2 MIXED HYPERLIPIDEMIA: ICD-10-CM

## 2024-01-23 PROCEDURE — 3017F COLORECTAL CA SCREEN DOC REV: CPT | Performed by: INTERNAL MEDICINE

## 2024-01-23 PROCEDURE — G8427 DOCREV CUR MEDS BY ELIG CLIN: HCPCS | Performed by: INTERNAL MEDICINE

## 2024-01-23 PROCEDURE — 1123F ACP DISCUSS/DSCN MKR DOCD: CPT | Performed by: INTERNAL MEDICINE

## 2024-01-23 PROCEDURE — 1036F TOBACCO NON-USER: CPT | Performed by: INTERNAL MEDICINE

## 2024-01-23 PROCEDURE — G8484 FLU IMMUNIZE NO ADMIN: HCPCS | Performed by: INTERNAL MEDICINE

## 2024-01-23 PROCEDURE — G8420 CALC BMI NORM PARAMETERS: HCPCS | Performed by: INTERNAL MEDICINE

## 2024-01-23 PROCEDURE — 99214 OFFICE O/P EST MOD 30 MIN: CPT | Performed by: INTERNAL MEDICINE

## 2024-01-23 RX ORDER — PRAVASTATIN SODIUM 40 MG
40 TABLET ORAL DAILY
Qty: 90 TABLET | Refills: 3 | Status: SHIPPED | OUTPATIENT
Start: 2024-01-23

## 2024-01-23 ASSESSMENT — PATIENT HEALTH QUESTIONNAIRE - PHQ9
SUM OF ALL RESPONSES TO PHQ QUESTIONS 1-9: 0
1. LITTLE INTEREST OR PLEASURE IN DOING THINGS: 0
SUM OF ALL RESPONSES TO PHQ9 QUESTIONS 1 & 2: 0
SUM OF ALL RESPONSES TO PHQ QUESTIONS 1-9: 0
2. FEELING DOWN, DEPRESSED OR HOPELESS: 0

## 2024-01-23 ASSESSMENT — ENCOUNTER SYMPTOMS
ABDOMINAL PAIN: 0
DIARRHEA: 0
RHINORRHEA: 0
CONSTIPATION: 0
VOMITING: 0
CHEST TIGHTNESS: 0
BLOOD IN STOOL: 0
SHORTNESS OF BREATH: 0
EYE PAIN: 0
SORE THROAT: 0
COUGH: 0
NAUSEA: 0

## 2024-01-23 NOTE — PROGRESS NOTES
review and labs.    Orders Placed This Encounter   Procedures    CBC with Auto Differential     Standing Status:   Future     Standing Expiration Date:   1/23/2025    Comprehensive Metabolic Panel     Standing Status:   Future     Standing Expiration Date:   1/23/2025    Lipid, Fasting     Standing Status:   Future     Standing Expiration Date:   1/23/2025    Magnesium     Standing Status:   Future     Standing Expiration Date:   1/23/2025    Urinalysis     Standing Status:   Future     Standing Expiration Date:   1/23/2025    TSH     Standing Status:   Future     Standing Expiration Date:   1/23/2025    PSA Screening     Standing Status:   Future     Standing Expiration Date:   1/23/2025    Vitamin D 25 Hydroxy     Standing Status:   Future     Standing Expiration Date:   1/23/2025     Requested Prescriptions     Signed Prescriptions Disp Refills    pravastatin (PRAVACHOL) 40 MG tablet 90 tablet 3     Sig: Take 1 tablet by mouth daily        Jairon Hobbs MD  1/23/2024  9:15 AM

## 2024-07-09 DIAGNOSIS — E55.9 VITAMIN D DEFICIENCY: ICD-10-CM

## 2024-07-09 DIAGNOSIS — I34.1 MVP (MITRAL VALVE PROLAPSE): ICD-10-CM

## 2024-07-09 DIAGNOSIS — E78.2 MIXED HYPERLIPIDEMIA: ICD-10-CM

## 2024-07-09 DIAGNOSIS — Z12.5 SCREENING FOR MALIGNANT NEOPLASM OF PROSTATE: ICD-10-CM

## 2024-07-09 LAB
ALBUMIN: 4.4 G/DL (ref 3.5–5.2)
ALP BLD-CCNC: 70 U/L (ref 40–129)
ALT SERPL-CCNC: 17 U/L (ref 0–40)
ANION GAP SERPL CALCULATED.3IONS-SCNC: 14 MMOL/L (ref 7–16)
AST SERPL-CCNC: 18 U/L (ref 0–39)
BASOPHILS ABSOLUTE: 0.03 K/UL (ref 0–0.2)
BASOPHILS RELATIVE PERCENT: 1 % (ref 0–2)
BILIRUB SERPL-MCNC: 0.5 MG/DL (ref 0–1.2)
BILIRUBIN, URINE: NEGATIVE
BUN BLDV-MCNC: 14 MG/DL (ref 6–23)
CALCIUM SERPL-MCNC: 9.6 MG/DL (ref 8.6–10.2)
CHLORIDE BLD-SCNC: 103 MMOL/L (ref 98–107)
CHOLESTEROL, FASTING: 196 MG/DL
CO2: 24 MMOL/L (ref 22–29)
COLOR, UA: YELLOW
COMMENT: NORMAL
CREAT SERPL-MCNC: 0.9 MG/DL (ref 0.7–1.2)
EOSINOPHILS ABSOLUTE: 0.08 K/UL (ref 0.05–0.5)
EOSINOPHILS RELATIVE PERCENT: 2 % (ref 0–6)
GFR, ESTIMATED: >90 ML/MIN/1.73M2
GLUCOSE BLD-MCNC: 97 MG/DL (ref 74–99)
GLUCOSE URINE: NEGATIVE MG/DL
HCT VFR BLD CALC: 46 % (ref 37–54)
HDLC SERPL-MCNC: 69 MG/DL
HEMOGLOBIN: 14.2 G/DL (ref 12.5–16.5)
IMMATURE GRANULOCYTES %: 0 % (ref 0–5)
IMMATURE GRANULOCYTES ABSOLUTE: <0.03 K/UL (ref 0–0.58)
KETONES, URINE: NEGATIVE MG/DL
LDL CHOLESTEROL: 108 MG/DL
LEUKOCYTE ESTERASE, URINE: NEGATIVE
LYMPHOCYTES ABSOLUTE: 1.35 K/UL (ref 1.5–4)
LYMPHOCYTES RELATIVE PERCENT: 28 % (ref 20–42)
MAGNESIUM: 2.1 MG/DL (ref 1.6–2.6)
MCH RBC QN AUTO: 28.2 PG (ref 26–35)
MCHC RBC AUTO-ENTMCNC: 30.9 G/DL (ref 32–34.5)
MCV RBC AUTO: 91.3 FL (ref 80–99.9)
MONOCYTES ABSOLUTE: 0.43 K/UL (ref 0.1–0.95)
MONOCYTES RELATIVE PERCENT: 9 % (ref 2–12)
NEUTROPHILS ABSOLUTE: 2.9 K/UL (ref 1.8–7.3)
NEUTROPHILS RELATIVE PERCENT: 60 % (ref 43–80)
NITRITE, URINE: NEGATIVE
PDW BLD-RTO: 14.2 % (ref 11.5–15)
PH, URINE: 6 (ref 5–9)
PLATELET # BLD: 199 K/UL (ref 130–450)
PMV BLD AUTO: 10.8 FL (ref 7–12)
POTASSIUM SERPL-SCNC: 4.5 MMOL/L (ref 3.5–5)
PROSTATE SPECIFIC ANTIGEN: 1.09 NG/ML (ref 0–4)
PROTEIN UA: NEGATIVE MG/DL
RBC # BLD: 5.04 M/UL (ref 3.8–5.8)
SODIUM BLD-SCNC: 141 MMOL/L (ref 132–146)
SPECIFIC GRAVITY UA: 1.02 (ref 1–1.03)
TOTAL PROTEIN: 6.8 G/DL (ref 6.4–8.3)
TRIGLYCERIDE, FASTING: 95 MG/DL
TSH SERPL DL<=0.05 MIU/L-ACNC: 1.71 UIU/ML (ref 0.27–4.2)
TURBIDITY: CLEAR
URINE HGB: NEGATIVE
UROBILINOGEN, URINE: 0.2 EU/DL (ref 0–1)
VITAMIN D 25-HYDROXY: 47.9 NG/ML (ref 30–100)
VLDLC SERPL CALC-MCNC: 19 MG/DL
WBC # BLD: 4.8 K/UL (ref 4.5–11.5)

## 2024-07-16 ENCOUNTER — OFFICE VISIT (OUTPATIENT)
Dept: FAMILY MEDICINE CLINIC | Age: 64
End: 2024-07-16
Payer: COMMERCIAL

## 2024-07-16 VITALS
OXYGEN SATURATION: 96 % | WEIGHT: 180 LBS | TEMPERATURE: 99 F | DIASTOLIC BLOOD PRESSURE: 70 MMHG | SYSTOLIC BLOOD PRESSURE: 126 MMHG | BODY MASS INDEX: 22.5 KG/M2 | HEART RATE: 72 BPM

## 2024-07-16 DIAGNOSIS — E55.9 VITAMIN D DEFICIENCY: ICD-10-CM

## 2024-07-16 DIAGNOSIS — N52.9 ERECTILE DYSFUNCTION, UNSPECIFIED ERECTILE DYSFUNCTION TYPE: ICD-10-CM

## 2024-07-16 DIAGNOSIS — E78.2 MIXED HYPERLIPIDEMIA: ICD-10-CM

## 2024-07-16 DIAGNOSIS — I34.1 MVP (MITRAL VALVE PROLAPSE): ICD-10-CM

## 2024-07-16 DIAGNOSIS — R55 NEUROCARDIOGENIC SYNCOPE: ICD-10-CM

## 2024-07-16 DIAGNOSIS — Z00.00 ENCOUNTER FOR WELL ADULT EXAM WITHOUT ABNORMAL FINDINGS: Primary | ICD-10-CM

## 2024-07-16 PROCEDURE — 99397 PER PM REEVAL EST PAT 65+ YR: CPT | Performed by: INTERNAL MEDICINE

## 2024-07-16 RX ORDER — PRAVASTATIN SODIUM 40 MG
40 TABLET ORAL DAILY
Qty: 90 TABLET | Refills: 3 | Status: SHIPPED | OUTPATIENT
Start: 2024-07-16

## 2024-07-16 RX ORDER — SILDENAFIL 50 MG/1
50 TABLET, FILM COATED ORAL DAILY PRN
Qty: 10 TABLET | Refills: 0 | Status: SHIPPED | OUTPATIENT
Start: 2024-07-16

## 2024-07-16 ASSESSMENT — ENCOUNTER SYMPTOMS
SHORTNESS OF BREATH: 0
CONSTIPATION: 0
COUGH: 0
NAUSEA: 0
BLOOD IN STOOL: 0
ABDOMINAL PAIN: 0
CHEST TIGHTNESS: 0
RHINORRHEA: 0
VOMITING: 0
EYE PAIN: 0
SORE THROAT: 0
DIARRHEA: 0

## 2024-07-16 NOTE — PROGRESS NOTES
Well Adult Note  Name: Raz Hope Jr. Today’s Date: 2024   MRN: [id number] Sex: Male   Age: 63 y.o. Ethnicity: Non- / Non    : 1960 Race: White (non-)      Raz Hope Jr. is here for a well adult exam.       Subjective   History:    -  has had occasional subconjunctival hemorrhages. No vision changes     - States has been having erectile issues. States intermittent. Asking for medications. - States having decreased urine flow, states increased frequency. No nocturia. Unchanged 2024    - States has noticed that since retiring is sleeping less. States previously would sleep 8-9 hours now sleeping 5-6 hours. States does nap during he day occasionally. Has noted snoring.    - States has toe nail issues and fungus.     - States low back pain. Improved. States lifting makes worse. States right sided lateral paraspinal muscles. States will be an ache and other times is sharp. Currently pain is 0/10. States still if sitting for prolonged times and is relieved if walks around. No numbness, tingling or weakness. States has seen chiropractor. Intermittently doing home exercises. Stable 2024     - States syncope. No further passing out since last visit. has seen cardiology and EP. States had tilt table (). States was positive for neurocardiogenic syncope. No further passing out or blacking out. States no changes. States trying to stay hydrated. Resolved at present 2024     - Has mitral valve prolapse - follows with cardiology yearly,  Had echo 2017 - Showed diastolic dysfunction and left vent hypertrophy - no valve disease. Last visit with cardiology per reviewed note () -neurocardiogenic syncope, mitral valve prolapse with trace MR TR on echo in 2017 update echo no need for tilt table at present, no antibiotic prophylaxis for valve issues needed follow-up as needed  - States had echo - no report available.      - States has some chronic aches to joints. States seem

## 2024-07-16 NOTE — PATIENT INSTRUCTIONS
Advance Care Planning     Advance Care Planning opens a door to talk about and write down your wishes before a sudden accident or illness.  Make your goals, values, and preferences known.     This puts you in the ’s seat and helps others know what matters most to you so they won’t have to guess.      Where can you learn more?    Go to [email or web address]   to learn how to:    Name someone you trust to make healthcare decisions for you, only if you can’t. (Healthcare Power of )    Document your wishes for care if you were seriously ill and not expected to recover or are approaching end of life. (Advance Directive or Living Will)    The same page can be found using the QR code below.                Well Visit, Ages 18 to 65: Care Instructions  Well visits can help you stay healthy. Your doctor has checked your overall health and may have suggested ways to take good care of yourself. Your doctor also may have recommended tests. You can help prevent illness with healthy eating, good sleep, vaccinations, regular exercise, and other steps.    Get the tests that you and your doctor decide on. Depending on your age and risks, examples might include screening for diabetes; hepatitis C; HIV; and cervical, breast, lung, and colon cancer. Screening helps find diseases before any symptoms appear.   Eat healthy foods. Choose fruits, vegetables, whole grains, lean protein, and low-fat dairy foods. Limit saturated fat and reduce salt.     Limit alcohol. Men should have no more than 2 drinks a day. Women should have no more than 1. For some people, no alcohol is the best choice.   Exercise. Get at least 30 minutes of exercise on most days of the week. Walking can be a good choice.     Reach and stay at your healthy weight. This will lower your risk for many health problems.   Take care of your mental health. Try to stay connected with friends, family, and community, and find

## 2025-01-13 ENCOUNTER — OFFICE VISIT (OUTPATIENT)
Dept: FAMILY MEDICINE CLINIC | Age: 65
End: 2025-01-13
Payer: MEDICARE

## 2025-01-13 VITALS
SYSTOLIC BLOOD PRESSURE: 118 MMHG | HEIGHT: 75 IN | WEIGHT: 185 LBS | RESPIRATION RATE: 20 BRPM | DIASTOLIC BLOOD PRESSURE: 76 MMHG | TEMPERATURE: 97.1 F | HEART RATE: 65 BPM | BODY MASS INDEX: 23 KG/M2 | OXYGEN SATURATION: 97 %

## 2025-01-13 DIAGNOSIS — R53.83 OTHER FATIGUE: ICD-10-CM

## 2025-01-13 DIAGNOSIS — R01.1 CARDIAC MURMUR: ICD-10-CM

## 2025-01-13 DIAGNOSIS — N52.9 ERECTILE DYSFUNCTION, UNSPECIFIED ERECTILE DYSFUNCTION TYPE: ICD-10-CM

## 2025-01-13 DIAGNOSIS — E78.2 MIXED HYPERLIPIDEMIA: ICD-10-CM

## 2025-01-13 DIAGNOSIS — R55 NEUROCARDIOGENIC SYNCOPE: ICD-10-CM

## 2025-01-13 DIAGNOSIS — Z12.5 SCREENING FOR MALIGNANT NEOPLASM OF PROSTATE: ICD-10-CM

## 2025-01-13 DIAGNOSIS — E55.9 VITAMIN D DEFICIENCY: ICD-10-CM

## 2025-01-13 DIAGNOSIS — R10.11 RIGHT UPPER QUADRANT ABDOMINAL PAIN: Primary | ICD-10-CM

## 2025-01-13 PROCEDURE — 1123F ACP DISCUSS/DSCN MKR DOCD: CPT | Performed by: INTERNAL MEDICINE

## 2025-01-13 PROCEDURE — 1160F RVW MEDS BY RX/DR IN RCRD: CPT | Performed by: INTERNAL MEDICINE

## 2025-01-13 PROCEDURE — 1159F MED LIST DOCD IN RCRD: CPT | Performed by: INTERNAL MEDICINE

## 2025-01-13 PROCEDURE — 99214 OFFICE O/P EST MOD 30 MIN: CPT | Performed by: INTERNAL MEDICINE

## 2025-01-13 RX ORDER — SILDENAFIL 50 MG/1
50 TABLET, FILM COATED ORAL DAILY PRN
Qty: 10 TABLET | Refills: 1 | Status: SHIPPED | OUTPATIENT
Start: 2025-01-13

## 2025-01-13 SDOH — ECONOMIC STABILITY: FOOD INSECURITY: WITHIN THE PAST 12 MONTHS, YOU WORRIED THAT YOUR FOOD WOULD RUN OUT BEFORE YOU GOT MONEY TO BUY MORE.: PATIENT DECLINED

## 2025-01-13 SDOH — ECONOMIC STABILITY: FOOD INSECURITY: WITHIN THE PAST 12 MONTHS, THE FOOD YOU BOUGHT JUST DIDN'T LAST AND YOU DIDN'T HAVE MONEY TO GET MORE.: PATIENT DECLINED

## 2025-01-13 ASSESSMENT — ENCOUNTER SYMPTOMS
RHINORRHEA: 0
SORE THROAT: 0
EYE PAIN: 0
COUGH: 0
DIARRHEA: 0
CONSTIPATION: 0
CHEST TIGHTNESS: 0
ABDOMINAL PAIN: 0
VOMITING: 0
SHORTNESS OF BREATH: 0
BLOOD IN STOOL: 0
NAUSEA: 0

## 2025-01-13 ASSESSMENT — PATIENT HEALTH QUESTIONNAIRE - PHQ9
2. FEELING DOWN, DEPRESSED OR HOPELESS: NOT AT ALL
1. LITTLE INTEREST OR PLEASURE IN DOING THINGS: NOT AT ALL
SUM OF ALL RESPONSES TO PHQ9 QUESTIONS 1 & 2: 0
SUM OF ALL RESPONSES TO PHQ QUESTIONS 1-9: 0

## 2025-01-13 NOTE — PROGRESS NOTES
[facility]   Internal Medicine     2025  Raz Hadleynabil Patino. : 1960 Sex: male  Age:64 y.o.    Chief Complaint   Patient presents with    Follow-up     Routine 6 month f/u         HPI:   Patient presents to office for evaluation of the following medical concerns.    - States has had occasional subconjunctival hemorrhages. No vision changes      - States has been having erectile issues. States intermittent. Asking for medications. - States having decreased urine flow, states increased frequency. No nocturia. Unchanged 2025     - States has noticed that since retiring is sleeping less. States previously would sleep 8-9 hours now sleeping 5-6 hours. States does nap during he day occasionally. Has noted snoring.  - improved      - States has toe nail issues and fungus.      - States low back pain. Improved. States lifting makes worse. States right sided lateral paraspinal muscles. States will be an ache and other times is sharp. Currently pain is 0/10. States still if sitting for prolonged times and is relieved if walks around. No numbness, tingling or weakness. States has seen chiropractor. Intermittently doing home exercises.   - Stable 2025'     - States syncope. No further passing out since last visit. has seen cardiology and EP. States had tilt table (). States was positive for neurocardiogenic syncope. No further passing out or blacking out. States no changes. States trying to stay hydrated.   - Resolved at present 2025     - Has mitral valve prolapse - follows with cardiology yearly,  Had echo 2017 - Showed diastolic dysfunction and left vent hypertrophy - no valve disease.   - Last visit with cardiology per reviewed note () -neurocardiogenic syncope, mitral valve prolapse with trace MR TR on echo in 2017 update echo no need for tilt table at present, no antibiotic prophylaxis for valve issues needed follow-up as needed  - States had echo - no report available. - will

## 2025-01-24 ENCOUNTER — TELEPHONE (OUTPATIENT)
Dept: FAMILY MEDICINE CLINIC | Age: 65
End: 2025-01-24

## 2025-01-24 DIAGNOSIS — Z12.11 SCREEN FOR COLON CANCER: ICD-10-CM

## 2025-01-24 DIAGNOSIS — R10.11 RIGHT UPPER QUADRANT ABDOMINAL PAIN: Primary | ICD-10-CM

## 2025-01-24 NOTE — TELEPHONE ENCOUNTER
I assume that means a referral for EGD and colonoscopy? I put that in, if that isn't correct I need more details on what he needs ordered

## 2025-01-24 NOTE — TELEPHONE ENCOUNTER
Raz is asking for a disc to be made for the US Gallbladder he had done here on 1/22/25.        He would like to pick that up in the office as soon as it is available.

## 2025-01-24 NOTE — TELEPHONE ENCOUNTER
I spoke to Stefano for clarification. He said not to worry about the additional tests because Dr Narvaez is going to do these since Dr Hobbs is out of the office.      Ok to cancel these?

## 2025-01-24 NOTE — TELEPHONE ENCOUNTER
Raz calling to let his PCP know that she saw Dr Narvaez, who has ordered an MRI of his abdomen.      He told Raz to contact his PCP and have him order an Upper & Lower scope, to have done between the end of February & mid March.

## 2025-01-24 NOTE — TELEPHONE ENCOUNTER
Message back to me from the radiology team:          Disk made and put at front office. Called patient to let him know

## 2025-01-24 NOTE — TELEPHONE ENCOUNTER
I tried to reach Max, but had to leave a message to call back regarding the orders placed by Dr Mendoza.

## 2025-06-24 LAB
ALBUMIN: 4.2 G/DL (ref 3.5–5.2)
ALP BLD-CCNC: 65 U/L (ref 40–129)
ALT SERPL-CCNC: 22 U/L (ref 0–50)
ANION GAP SERPL CALCULATED.3IONS-SCNC: 14 MMOL/L (ref 7–16)
AST SERPL-CCNC: 23 U/L (ref 0–50)
BASOPHILS ABSOLUTE: 0.03 K/UL (ref 0–0.2)
BASOPHILS RELATIVE PERCENT: 1 % (ref 0–2)
BILIRUB SERPL-MCNC: 0.4 MG/DL (ref 0–1.2)
BUN BLDV-MCNC: 14 MG/DL (ref 8–23)
CALCIUM SERPL-MCNC: 9.6 MG/DL (ref 8.8–10.2)
CHLORIDE BLD-SCNC: 104 MMOL/L (ref 98–107)
CHOLESTEROL, FASTING: 186 MG/DL
CO2: 23 MMOL/L (ref 22–29)
CREAT SERPL-MCNC: 0.9 MG/DL (ref 0.7–1.2)
EOSINOPHILS ABSOLUTE: 0.09 K/UL (ref 0.05–0.5)
EOSINOPHILS RELATIVE PERCENT: 2 % (ref 0–6)
GFR, ESTIMATED: >90 ML/MIN/1.73M2
GLUCOSE BLD-MCNC: 98 MG/DL (ref 74–99)
HCT VFR BLD CALC: 41.4 % (ref 37–54)
HDLC SERPL-MCNC: 78 MG/DL
HEMOGLOBIN: 13.6 G/DL (ref 12.5–16.5)
IMMATURE GRANULOCYTES %: 0 % (ref 0–5)
IMMATURE GRANULOCYTES ABSOLUTE: <0.03 K/UL (ref 0–0.58)
LDL CHOLESTEROL: 87 MG/DL
LYMPHOCYTES ABSOLUTE: 1.19 K/UL (ref 1.5–4)
LYMPHOCYTES RELATIVE PERCENT: 30 % (ref 20–42)
MAGNESIUM: 2.1 MG/DL (ref 1.6–2.4)
MCH RBC QN AUTO: 28.7 PG (ref 26–35)
MCHC RBC AUTO-ENTMCNC: 32.9 G/DL (ref 32–34.5)
MCV RBC AUTO: 87.3 FL (ref 80–99.9)
MONOCYTES ABSOLUTE: 0.41 K/UL (ref 0.1–0.95)
MONOCYTES RELATIVE PERCENT: 10 % (ref 2–12)
NEUTROPHILS ABSOLUTE: 2.23 K/UL (ref 1.8–7.3)
NEUTROPHILS RELATIVE PERCENT: 56 % (ref 43–80)
PDW BLD-RTO: 13.7 % (ref 11.5–15)
PLATELET # BLD: 177 K/UL (ref 130–450)
PMV BLD AUTO: 10.8 FL (ref 7–12)
POTASSIUM SERPL-SCNC: 3.8 MMOL/L (ref 3.5–5.1)
PROSTATE SPECIFIC ANTIGEN: 0.99 NG/ML (ref 0–4)
RBC # BLD: 4.74 M/UL (ref 3.8–5.8)
SODIUM BLD-SCNC: 142 MMOL/L (ref 136–145)
TESTOSTERONE TOTAL: 599 NG/DL (ref 193–740)
TOTAL PROTEIN: 6.4 G/DL (ref 6.4–8.3)
TRIGLYCERIDE, FASTING: 105 MG/DL
TSH SERPL DL<=0.05 MIU/L-ACNC: 1.76 UIU/ML (ref 0.27–4.2)
VITAMIN D 25-HYDROXY: 46.8 NG/ML (ref 30–100)
VLDLC SERPL CALC-MCNC: 21 MG/DL
WBC # BLD: 4 K/UL (ref 4.5–11.5)

## 2025-06-25 ENCOUNTER — TELEPHONE (OUTPATIENT)
Dept: PRIMARY CARE CLINIC | Age: 65
End: 2025-06-25

## 2025-06-25 NOTE — TELEPHONE ENCOUNTER
Please let the patient know that blood work results showed    Blood counts showed that white blood cell count was decreased and also associated with decrease in the overall lymphocyte count both of uncertain cause or significance and similar when compared to previous    Cholesterol levels were improved when compared to previous    PSA for prostate was normal and similar when compared to previous    Thyroid levels were normal    Testosterone level was in normal range    Vitamin D levels were normal    Electrolytes including magnesium, liver functions, and kidney function values were normal    Thanks

## 2025-07-08 ENCOUNTER — OFFICE VISIT (OUTPATIENT)
Dept: FAMILY MEDICINE CLINIC | Age: 65
End: 2025-07-08
Payer: MEDICARE

## 2025-07-08 VITALS
WEIGHT: 171 LBS | OXYGEN SATURATION: 98 % | HEART RATE: 68 BPM | SYSTOLIC BLOOD PRESSURE: 130 MMHG | DIASTOLIC BLOOD PRESSURE: 70 MMHG | RESPIRATION RATE: 20 BRPM | BODY MASS INDEX: 21.26 KG/M2 | TEMPERATURE: 98.1 F | HEIGHT: 75 IN

## 2025-07-08 VITALS
WEIGHT: 171 LBS | RESPIRATION RATE: 20 BRPM | OXYGEN SATURATION: 98 % | DIASTOLIC BLOOD PRESSURE: 70 MMHG | SYSTOLIC BLOOD PRESSURE: 130 MMHG | HEART RATE: 68 BPM | TEMPERATURE: 98.1 F | HEIGHT: 75 IN | BODY MASS INDEX: 21.26 KG/M2

## 2025-07-08 DIAGNOSIS — Z00.00 INITIAL MEDICARE ANNUAL WELLNESS VISIT: Primary | ICD-10-CM

## 2025-07-08 DIAGNOSIS — R53.83 OTHER FATIGUE: ICD-10-CM

## 2025-07-08 DIAGNOSIS — H61.21 IMPACTED CERUMEN OF RIGHT EAR: ICD-10-CM

## 2025-07-08 DIAGNOSIS — N52.9 ERECTILE DYSFUNCTION, UNSPECIFIED ERECTILE DYSFUNCTION TYPE: ICD-10-CM

## 2025-07-08 DIAGNOSIS — K76.89 LIVER CYST: ICD-10-CM

## 2025-07-08 DIAGNOSIS — R55 NEUROCARDIOGENIC SYNCOPE: ICD-10-CM

## 2025-07-08 DIAGNOSIS — E55.9 VITAMIN D DEFICIENCY: ICD-10-CM

## 2025-07-08 DIAGNOSIS — K29.70 GASTRITIS WITHOUT BLEEDING, UNSPECIFIED CHRONICITY, UNSPECIFIED GASTRITIS TYPE: ICD-10-CM

## 2025-07-08 DIAGNOSIS — R01.1 CARDIAC MURMUR: ICD-10-CM

## 2025-07-08 DIAGNOSIS — E78.2 MIXED HYPERLIPIDEMIA: Primary | ICD-10-CM

## 2025-07-08 PROCEDURE — G8420 CALC BMI NORM PARAMETERS: HCPCS | Performed by: INTERNAL MEDICINE

## 2025-07-08 PROCEDURE — 99213 OFFICE O/P EST LOW 20 MIN: CPT | Performed by: INTERNAL MEDICINE

## 2025-07-08 PROCEDURE — 69210 REMOVE IMPACTED EAR WAX UNI: CPT | Performed by: INTERNAL MEDICINE

## 2025-07-08 PROCEDURE — 3017F COLORECTAL CA SCREEN DOC REV: CPT | Performed by: INTERNAL MEDICINE

## 2025-07-08 PROCEDURE — 1160F RVW MEDS BY RX/DR IN RCRD: CPT | Performed by: INTERNAL MEDICINE

## 2025-07-08 PROCEDURE — 1159F MED LIST DOCD IN RCRD: CPT | Performed by: INTERNAL MEDICINE

## 2025-07-08 PROCEDURE — G0438 PPPS, INITIAL VISIT: HCPCS | Performed by: INTERNAL MEDICINE

## 2025-07-08 PROCEDURE — 1036F TOBACCO NON-USER: CPT | Performed by: INTERNAL MEDICINE

## 2025-07-08 PROCEDURE — G8427 DOCREV CUR MEDS BY ELIG CLIN: HCPCS | Performed by: INTERNAL MEDICINE

## 2025-07-08 PROCEDURE — 1123F ACP DISCUSS/DSCN MKR DOCD: CPT | Performed by: INTERNAL MEDICINE

## 2025-07-08 RX ORDER — SILDENAFIL 50 MG/1
50 TABLET, FILM COATED ORAL DAILY PRN
Qty: 10 TABLET | Refills: 1 | Status: SHIPPED | OUTPATIENT
Start: 2025-07-08

## 2025-07-08 RX ORDER — PRAVASTATIN SODIUM 40 MG
40 TABLET ORAL DAILY
Qty: 90 TABLET | Refills: 3 | Status: SHIPPED | OUTPATIENT
Start: 2025-07-08

## 2025-07-08 ASSESSMENT — PATIENT HEALTH QUESTIONNAIRE - PHQ9
SUM OF ALL RESPONSES TO PHQ QUESTIONS 1-9: 0
1. LITTLE INTEREST OR PLEASURE IN DOING THINGS: NOT AT ALL
2. FEELING DOWN, DEPRESSED OR HOPELESS: NOT AT ALL

## 2025-07-08 ASSESSMENT — LIFESTYLE VARIABLES
HOW OFTEN DURING THE LAST YEAR HAVE YOU BEEN UNABLE TO REMEMBER WHAT HAPPENED THE NIGHT BEFORE BECAUSE YOU HAD BEEN DRINKING: NEVER
HOW MANY STANDARD DRINKS CONTAINING ALCOHOL DO YOU HAVE ON A TYPICAL DAY: 3 OR 4
HOW OFTEN DURING THE LAST YEAR HAVE YOU FOUND THAT YOU WERE NOT ABLE TO STOP DRINKING ONCE YOU HAD STARTED: NEVER
HOW OFTEN DURING THE LAST YEAR HAVE YOU FAILED TO DO WHAT WAS NORMALLY EXPECTED FROM YOU BECAUSE OF DRINKING: NEVER
HAVE YOU OR SOMEONE ELSE BEEN INJURED AS A RESULT OF YOUR DRINKING: NO
HOW OFTEN DURING THE LAST YEAR HAVE YOU NEEDED AN ALCOHOLIC DRINK FIRST THING IN THE MORNING TO GET YOURSELF GOING AFTER A NIGHT OF HEAVY DRINKING: NEVER
HOW OFTEN DO YOU HAVE A DRINK CONTAINING ALCOHOL: 2-4 TIMES A MONTH
HOW OFTEN DURING THE LAST YEAR HAVE YOU HAD A FEELING OF GUILT OR REMORSE AFTER DRINKING: NEVER
HAS A RELATIVE, FRIEND, DOCTOR, OR ANOTHER HEALTH PROFESSIONAL EXPRESSED CONCERN ABOUT YOUR DRINKING OR SUGGESTED YOU CUT DOWN: NO

## 2025-07-08 ASSESSMENT — ENCOUNTER SYMPTOMS
VOMITING: 0
CHEST TIGHTNESS: 0
RHINORRHEA: 0
CONSTIPATION: 0
COUGH: 0
EYE PAIN: 0
NAUSEA: 0
SHORTNESS OF BREATH: 0
ABDOMINAL PAIN: 0
DIARRHEA: 0
SORE THROAT: 0
BLOOD IN STOOL: 0

## 2025-07-08 NOTE — PROGRESS NOTES
[facility]   Internal Medicine     2025  Raz Hope : 1960 Sex: male  Age:64 y.o.    Chief Complaint   Patient presents with    Follow-up     Routine 6 month f/u         HPI:   Patient presents to office for evaluation of the following medical concerns.    - States was having some Right upper quadrant pain  - States has been having upper to right upper discomfort when taking medications,  States if holds the medication improves.  - US gallbladder () - Coarse hepatic echotexture, which can be seen in the setting of hepatocellular disease. 1.3 cm hyperechoic right hepatic lesion, which may reflect a hemangioma. Recommend further evaluation with a contrast-enhanced MRI of the abdomen  - MRI abdo () - 3.3 cm nonenhancing cyst in the dome of the liver with few additional smaller cysts in the liver.  No suspicious mass lesions  - EGD (3/2025) - normal esophagus, erosive gastritis, normal duodenum   - Colonoscopy (3/25) -tortuous transverse colon, left diverticulosis anticipate repeat colonoscopy 8 to 10 years  - started prilosec otc   - States pain has some improved     - States was told had basal cell ca to the chest. States needs further treatment.      - States has been having erectile issues. States intermittent. Asking for medications. - States having decreased urine flow, states increased frequency. No nocturia. Unchanged 2025     - States has noticed that since retiring is sleeping less. States previously would sleep 8-9 hours now sleeping 5-6 hours. States does nap during he day occasionally. Has noted snoring.  - improved     - States low back pain. Improved. States lifting makes worse. States right sided lateral paraspinal muscles. States will be an ache and other times is sharp. Currently pain is 0/10. States still if sitting for prolonged times and is relieved if walks around. No numbness, tingling or weakness. States has seen chiropractor. Intermittently doing home

## 2025-07-08 NOTE — PROGRESS NOTES
Medicare Annual Wellness Visit    Raz Hope is here for Medicare AWV    Assessment & Plan   Initial Medicare annual wellness visit         Health Maintenance  - immunizations:  Influenza Vaccination  - (2013), ([id number]) - [facility], (2024)   Pneumonia Vaccination - (6/2024) - prevnar 20   Zoster/Shingles Vaccine - (10/2020) #1, (12/5/2020) #2 - shingrix  Tetanus Vaccination (2018) - tdap [facility]  covid (3/12/2021) #1, (4/9/2021) #2, (11/27/2021) #3 - moderna  Rsv      - Screenings:  Colonoscopy - (4/2016) - tortuous - repeat 8-10yrs  - Colonoscopy (3/25) -tortuous transverse colon, left diverticulosis anticipate repeat colonoscopy 8 to 10 years    Prostate    Return for Medicare Annual Wellness Visit in 1 year.     Subjective     Patient's complete Health Risk Assessment and screening values have been reviewed and are found in Flowsheets. The following problems were reviewed today and where indicated follow up appointments were made and/or referrals ordered.    Positive Risk Factor Screenings with Interventions:       Alcohol Screening:  AUDIT-C Score: 4  AUDIT Total Score: 4  Total Score Interpretation: 0-7 suggests low risk alcohol consumption but assess individual risks   Interventions:  See AVS for additional education material                   Vision Screen:  Do you have difficulty driving, watching TV, or doing any of your daily activities because of your eyesight?: No  Have you had an eye exam within the past year?: (!) No  Interventions:   Patient encouraged to make appointment with their eye specialist      Advanced Directives:  Do you have a Living Will?: (!) No    Intervention:  has NO advanced directive - information provided                     Objective   Vitals:    07/08/25 1002   BP: 130/70   BP Site: Left Upper Arm   Patient Position: Sitting   BP Cuff Size: Medium Adult   Pulse: 68   Resp: 20   Temp: 98.1 °F (36.7 °C)   TempSrc: Temporal   SpO2: 98%   Weight: 77.6 kg (171 lb)   Height: 1.905 m

## 2025-07-08 NOTE — PATIENT INSTRUCTIONS
sodium, no added salt, and unsalted   Skip the salt when cooking or at the table; if food needs more flavor, get creative and try out different herbs and spices. Garlic and onion also add substantial flavor to foods.    Trim any visible fat off meat and poultry before cooking, and drain the fat off after tello.    Use cooking methods that require little or no added fat, such as grilling, boiling, baking, poaching, broiling, roasting, steaming, stir-frying, and sauting.    Avoid fast food and convenience food. They tend to be high in saturated and trans fat and have a lot of added salt.    Talk to a registered dietitian for individualized diet advice.      Last Reviewed: March 2011 Gillian Graham MS, MPH, RD   Updated: 3/29/2011     Keeping Home a Safe Haven       As we get older, changes in balance, gait, strength, vision, hearing, and cognition make even the most youthful senior more prone to accidents. Falls are one of the leading health risks for older people. This increased risk of falling is related to:   Aging process (eg, decreased muscle strength, slowed reflexes)   Higher incidence of chronic health problems (eg, arthritis, diabetes) that may limit mobility, agility or sensory awareness   Side effects of medicine (eg, dizziness, blurred vision)especially medicines like prescription pain medicines and drugs used to treat mental health conditions   Depending on the brittleness of your bones, the consequences of a fall can be serious and long lasting.   Home Life   Research by the [employer] of [employer]ging (AOA) shows that some home accidents among older adults can be prevented by making simple lifestyle changes and basic modifications and repairs to the home environment. Here are some lifestyle changes that experts recommend:   Have your hearing and vision checked regularly. Be sure to wear prescription glasses that are right for you.   Speak to your doctor or pharmacist about the possible side effects of your